# Patient Record
Sex: MALE | Race: OTHER | HISPANIC OR LATINO | Employment: FULL TIME | URBAN - METROPOLITAN AREA
[De-identification: names, ages, dates, MRNs, and addresses within clinical notes are randomized per-mention and may not be internally consistent; named-entity substitution may affect disease eponyms.]

---

## 2019-12-18 ENCOUNTER — APPOINTMENT (OUTPATIENT)
Dept: RADIOLOGY | Facility: MEDICAL CENTER | Age: 39
End: 2019-12-18
Payer: OTHER MISCELLANEOUS

## 2019-12-18 ENCOUNTER — APPOINTMENT (OUTPATIENT)
Dept: URGENT CARE | Facility: MEDICAL CENTER | Age: 39
End: 2019-12-18
Payer: OTHER MISCELLANEOUS

## 2019-12-18 DIAGNOSIS — M25.511 RIGHT SHOULDER PAIN, UNSPECIFIED CHRONICITY: ICD-10-CM

## 2019-12-18 DIAGNOSIS — M54.9 BACK PAIN, UNSPECIFIED BACK LOCATION, UNSPECIFIED BACK PAIN LATERALITY, UNSPECIFIED CHRONICITY: Primary | ICD-10-CM

## 2019-12-18 DIAGNOSIS — M54.9 BACK PAIN, UNSPECIFIED BACK LOCATION, UNSPECIFIED BACK PAIN LATERALITY, UNSPECIFIED CHRONICITY: ICD-10-CM

## 2019-12-18 PROCEDURE — 73030 X-RAY EXAM OF SHOULDER: CPT

## 2019-12-18 PROCEDURE — G0382 LEV 3 HOSP TYPE B ED VISIT: HCPCS | Performed by: PHYSICIAN ASSISTANT

## 2019-12-18 PROCEDURE — 72100 X-RAY EXAM L-S SPINE 2/3 VWS: CPT

## 2019-12-18 PROCEDURE — 99283 EMERGENCY DEPT VISIT LOW MDM: CPT | Performed by: PHYSICIAN ASSISTANT

## 2019-12-21 ENCOUNTER — APPOINTMENT (OUTPATIENT)
Dept: URGENT CARE | Facility: MEDICAL CENTER | Age: 39
End: 2019-12-21
Payer: OTHER MISCELLANEOUS

## 2019-12-21 PROCEDURE — 99213 OFFICE O/P EST LOW 20 MIN: CPT

## 2019-12-27 ENCOUNTER — APPOINTMENT (OUTPATIENT)
Dept: URGENT CARE | Facility: MEDICAL CENTER | Age: 39
End: 2019-12-27
Payer: OTHER MISCELLANEOUS

## 2019-12-27 PROCEDURE — 99213 OFFICE O/P EST LOW 20 MIN: CPT | Performed by: PHYSICIAN ASSISTANT

## 2020-01-08 ENCOUNTER — APPOINTMENT (OUTPATIENT)
Dept: URGENT CARE | Facility: MEDICAL CENTER | Age: 40
End: 2020-01-08
Payer: OTHER MISCELLANEOUS

## 2020-01-08 PROCEDURE — 99213 OFFICE O/P EST LOW 20 MIN: CPT | Performed by: PHYSICIAN ASSISTANT

## 2020-01-15 ENCOUNTER — APPOINTMENT (OUTPATIENT)
Dept: URGENT CARE | Facility: MEDICAL CENTER | Age: 40
End: 2020-01-15
Payer: OTHER MISCELLANEOUS

## 2020-01-15 PROCEDURE — 99213 OFFICE O/P EST LOW 20 MIN: CPT | Performed by: PHYSICIAN ASSISTANT

## 2020-06-29 ENCOUNTER — APPOINTMENT (OUTPATIENT)
Dept: RADIOLOGY | Facility: MEDICAL CENTER | Age: 40
End: 2020-06-29
Payer: OTHER MISCELLANEOUS

## 2020-06-29 ENCOUNTER — OCCMED (OUTPATIENT)
Dept: URGENT CARE | Facility: MEDICAL CENTER | Age: 40
End: 2020-06-29
Payer: OTHER MISCELLANEOUS

## 2020-06-29 DIAGNOSIS — M54.16 LUMBAR BACK PAIN WITH RADICULOPATHY AFFECTING LEFT LOWER EXTREMITY: Primary | ICD-10-CM

## 2020-06-29 DIAGNOSIS — M54.16 LUMBAR BACK PAIN WITH RADICULOPATHY AFFECTING LEFT LOWER EXTREMITY: ICD-10-CM

## 2020-06-29 PROCEDURE — 72110 X-RAY EXAM L-2 SPINE 4/>VWS: CPT

## 2020-06-29 PROCEDURE — G0382 LEV 3 HOSP TYPE B ED VISIT: HCPCS | Performed by: PHYSICIAN ASSISTANT

## 2020-06-29 PROCEDURE — 99283 EMERGENCY DEPT VISIT LOW MDM: CPT | Performed by: PHYSICIAN ASSISTANT

## 2020-07-06 ENCOUNTER — APPOINTMENT (OUTPATIENT)
Dept: URGENT CARE | Facility: MEDICAL CENTER | Age: 40
End: 2020-07-06
Payer: OTHER MISCELLANEOUS

## 2020-07-06 PROCEDURE — 99213 OFFICE O/P EST LOW 20 MIN: CPT | Performed by: PHYSICIAN ASSISTANT

## 2020-07-20 ENCOUNTER — APPOINTMENT (OUTPATIENT)
Dept: URGENT CARE | Facility: MEDICAL CENTER | Age: 40
End: 2020-07-20
Payer: OTHER MISCELLANEOUS

## 2020-07-20 PROCEDURE — 99214 OFFICE O/P EST MOD 30 MIN: CPT | Performed by: FAMILY MEDICINE

## 2020-07-23 VITALS
BODY MASS INDEX: 26.3 KG/M2 | HEART RATE: 67 BPM | HEIGHT: 76 IN | SYSTOLIC BLOOD PRESSURE: 122 MMHG | WEIGHT: 216 LBS | DIASTOLIC BLOOD PRESSURE: 76 MMHG

## 2020-07-23 DIAGNOSIS — M51.26 BULGING LUMBAR DISC: ICD-10-CM

## 2020-07-23 DIAGNOSIS — G89.29 CHRONIC LEFT-SIDED LOW BACK PAIN WITH LEFT-SIDED SCIATICA: Primary | ICD-10-CM

## 2020-07-23 DIAGNOSIS — M54.42 CHRONIC LEFT-SIDED LOW BACK PAIN WITH LEFT-SIDED SCIATICA: Primary | ICD-10-CM

## 2020-07-23 PROCEDURE — 99204 OFFICE O/P NEW MOD 45 MIN: CPT | Performed by: EMERGENCY MEDICINE

## 2020-07-23 RX ORDER — MELOXICAM 15 MG/1
15 TABLET ORAL DAILY
Qty: 30 TABLET | Refills: 0 | Status: SHIPPED | OUTPATIENT
Start: 2020-07-23 | End: 2020-12-04

## 2020-07-23 NOTE — PROGRESS NOTES
Assessment/Plan:    Diagnoses and all orders for this visit:    Chronic left-sided low back pain with left-sided sciatica  -     meloxicam (MOBIC) 15 mg tablet; Take 1 tablet (15 mg total) by mouth daily    Bulging lumbar disc  -     meloxicam (MOBIC) 15 mg tablet; Take 1 tablet (15 mg total) by mouth daily    Will start mobic, continue PT as he's only had 3 sessions  He does have mild left leg weakness L3 and 4  Completed course of prednisone   If no improvement t/c referral to Pain Management given chronicity and weakness  Reviewed previous Xray and MRI results  No Occ Med notes available  Radiculopathy information and Mobic instructions provided  Work note provided with restrictions    Return in about 2 weeks (around 8/6/2020)  Chief Complaint:     Chief Complaint   Patient presents with    Spine - Pain       Subjective:   Patient ID: Ce Saenz is a 35 y o  male  WC DOI 12/2019    NP presents referred by [facility] for work related injury, c/o left lower back, buttocks and leg pain along with sense of weakness, did complete prednisone course, was taking ibuprofen PRN and prescribed muscle relaxant  Only recently started PT had 3 sessions  Denies changes b/b  MRI lumbar spine was obtained  Pain worse with sitting and standing > 20 min  Has been out of work recently  Review of Systems   Constitutional: Negative for chills and fever  HENT: Negative for sore throat and trouble swallowing  Eyes: Negative for pain and discharge  Respiratory: Negative for choking and shortness of breath  Cardiovascular: Negative for chest pain and palpitations  Gastrointestinal: Negative for abdominal pain and vomiting  Endocrine: Negative for cold intolerance and heat intolerance  Musculoskeletal: Positive for back pain  Negative for neck pain and neck stiffness  Neurological: Positive for weakness and numbness  Negative for dizziness     Psychiatric/Behavioral: Negative for self-injury and suicidal ideas  The following portions of the patient's chart were reviewed and updated as appropriate: Allergy:  No Known Allergies    History reviewed  No pertinent past medical history  History reviewed  No pertinent surgical history  Social History     Socioeconomic History    Marital status: Single     Spouse name: Not on file    Number of children: Not on file    Years of education: Not on file    Highest education level: Not on file   Occupational History    Not on file   Social Needs    Financial resource strain: Not on file    Food insecurity:     Worry: Not on file     Inability: Not on file    Transportation needs:     Medical: Not on file     Non-medical: Not on file   Tobacco Use    Smoking status: Never Smoker    Smokeless tobacco: Never Used   Substance and Sexual Activity    Alcohol use: Not Currently    Drug use: Not Currently    Sexual activity: Not on file   Lifestyle    Physical activity:     Days per week: Not on file     Minutes per session: Not on file    Stress: Not on file   Relationships    Social connections:     Talks on phone: Not on file     Gets together: Not on file     Attends Shinto service: Not on file     Active member of club or organization: Not on file     Attends meetings of clubs or organizations: Not on file     Relationship status: Not on file    Intimate partner violence:     Fear of current or ex partner: Not on file     Emotionally abused: Not on file     Physically abused: Not on file     Forced sexual activity: Not on file   Other Topics Concern    Not on file   Social History Narrative    Not on file       History reviewed  No pertinent family history  Medications:    Current Outpatient Medications:     meloxicam (MOBIC) 15 mg tablet, Take 1 tablet (15 mg total) by mouth daily, Disp: 30 tablet, Rfl: 0    There is no problem list on file for this patient        Objective:  /76   Pulse 67   Ht 6' 4" (1 93 m) Wt 98 kg (216 lb)   BMI 26 29 kg/m²     Back Exam     Range of Motion   Extension: normal   Flexion: abnormal     Muscle Strength   Right Quadriceps:  5/5   Left Quadriceps:  4/5     Tests   Straight leg raise right: negative  Straight leg raise left: positive    Reflexes   Patellar: normal    Other   Toe walk: normal  Heel walk: normal  Sensation: normal  Erythema: no back redness            Physical Exam   Constitutional: He is oriented to person, place, and time  He appears well-developed and well-nourished  HENT:   Head: Normocephalic and atraumatic  Eyes: Conjunctivae are normal    Neck: Neck supple  Pulmonary/Chest: Effort normal    Neurological: He is alert and oriented to person, place, and time  Skin: Skin is warm and dry  Psychiatric: He has a normal mood and affect  His behavior is normal    Vitals reviewed  Neurologic Exam     Mental Status   Oriented to person, place, and time  Motor Exam     Strength   Right quadriceps: 5/5  Left quadriceps: 4/5      Procedures    I have personally reviewed the written report of the pertinent studies       MRI Lumbar Spine 7/15/20  L4-5 Left lateral disc protrusion with mild to modrate compromise of left foramen  L5S1 broad based central disc protrustion      6/2020  LUMBAR SPINE     INDICATION:   M54 16: Radiculopathy, lumbar region      COMPARISON:  None     VIEWS:  XR SPINE LUMBAR MINIMUM 4 VIEWS NON INJURY     FINDINGS:     There are 5 non rib bearing lumbar vertebral bodies       There is no evidence of acute fracture or destructive osseous lesion      Alignment is unremarkable       No significant lumbar degenerative change noted      The pedicles appear intact      Soft tissues are unremarkable      IMPRESSION:     Normal examination

## 2020-07-23 NOTE — PATIENT INSTRUCTIONS
While taking meloxicam do not take any other NSAIDs such as Advil, ibuprofen, naproxen or Aleve  However you may take Tylenol  Lumbar Radiculopathy   WHAT YOU NEED TO KNOW:   Lumbar radiculopathy is a painful condition that happens when a nerve in your lumbar spine (lower back) is pinched or irritated  Nerves control feeling and movement in your body  You may have numbness or pain that shoots down from your lower back towards your foot  DISCHARGE INSTRUCTIONS:   Medicines:   · Medicines:     ¨ NSAIDs , such as ibuprofen, help decrease swelling, pain, and fever  This medicine is available with or without a doctor's order  NSAIDs can cause stomach bleeding or kidney problems in certain people  If you take blood thinner medicine, always ask your healthcare provider if NSAIDs are safe for you  Always read the medicine label and follow directions  ¨ Muscle relaxers  help decrease pain and muscle spasms  ¨ Opioids: This is a strong medicine given to reduce severe pain  It is also called narcotic pain medicine  Take this medicine exactly as directed by your healthcare provider  ¨ Oral steroids: Steroids may also be given to reduce pain and swelling  ¨ Take your medicine as directed  Contact your healthcare provider if you think your medicine is not helping or if you have side effects  Tell him of her if you are allergic to any medicine  Keep a list of the medicines, vitamins, and herbs you take  Include the amounts, and when and why you take them  Bring the list or the pill bottles to follow-up visits  Carry your medicine list with you in case of an emergency  Follow up with your healthcare provider or spine specialist within 1 to 3 weeks:  After your first follow-up appointment, return to your healthcare provider or spine specialist every 2 weeks until you have healed  Ask for information about physical therapy for your condition   Write down your questions so you remember to ask them during your visits  Physical therapy:  You may need physical therapy to improve your condition  Your physical therapist may teach you certain exercises to improve posture (the way you stand and sit), flexibility, and strength in your lower back  Self care:   · Stay active: It is best to be active when you have lumbar radiculopathy  Your physical therapist or healthcare provider may tell you to take walks to ease yourself back into your daily routine  Avoid long periods of bed rest  Bed rest could worsen your symptoms  Do not move in ways that increase your pain  Ask for more information about the best ways to stay active  · Use ice or heat packs:  Use ice or heat packs as directed on the sore area of your body to decrease the pain and swelling  Put ice in a plastic bag covered with a towel on your low back  Cover heated items with a towel to avoid burns  Use ice and heat as directed  · Avoid heavy lifting: Your condition may worsen if you lift heavy things  Avoid lifting if possible  · Maintain a healthy weight:  Excess body weight may strain your back  Talk with your healthcare provider about ways to lose excess weight if you are overweight  Contact your healthcare provider or spine specialist if:   · Your pain does not improve within 1 to 3 weeks after treatment  · Your pain and weakness keep you from your normal activities at work, home, or school  · You lose more than 10 pounds in 6 months without trying  · You become depressed or sad because of the pain  · You have questions or concerns about your condition or care  Return to the emergency department if:   · You have a fever greater than 100 4°F for longer than 2 days  · You have new, severe back or leg pain, or your pain spreads to both legs  · You have any new signs of numbness or weakness, especially in your lower back, legs, arms, or genital area  · You have new trouble controlling your urine and bowel movements      · You do not feel like your bladder empties when you urinate  © 2017 [employer] Information is for End User's use only and may not be sold, redistributed or otherwise used for commercial purposes  All illustrations and images included in CareNotes® are the copyrighted property of A D A M , Inc  or [employer]  The above information is an  only  It is not intended as medical advice for individual conditions or treatments  Talk to your doctor, nurse or pharmacist before following any medical regimen to see if it is safe and effective for you  Radiculopatía lumbar   LO QUE NECESITA SABER:   La radiculopatía lumbar es marina enfermedad dolorosa que sucede cuando un nervio de gates paul lumbar (parte baja de la espalda) se pinza o se irrita  Los nervios controlan las sensaciones y movimientos de gates cuerpo  Usted podría tener adormecimiento o dolor desde la parte inferior de la espalda hacia erica pies  INSTRUCCIONES SOBRE EL [facility]ITAL[facility]:   Medicamentos:   · Medicamentos:     ¨ AINEs (Analgésicos antiinflamatorios no esteroides) ishmael el ibuprofeno, ayudan a disminuir la inflamación, el dolor y la fiebre  Linda medicamento esta disponible con o sin marina receta médica  Los AINEs pueden causar sangrado estomacal o problemas renales en ciertas personas  Si usted debra un medicamento anticoagulante, siempre pregúntele a gates médico si los PAUL son seguros para usted  Siempre herbie la etiqueta de linda medicamento y [place] instrucciones  ¨ Relajantes musculares  ayudan a reducir dolor y espasmos musculares  ¨ Opioides: Estos son medicamentos muy mario que se administran para reducir el dolor severo  También se les conoce ishmael medicamentos narcóticos para el dolor  [place] el medicamento exactamente ishmael le indicó gates médico     ¨ Esteroides orales: Los esteroides se administran para reducir la inflamación y el dolor  ¨ [place] erica medicamentos ishmael se le haya indicado    Consulte con gates médico si usted alysia que gates medicamento no le está ayudando o si presenta efectos secundarios  Infórmele si es alérgico a algún medicamento  Mantenga marina lista actualizada de los [place], las vitaminas y los productos herbales que debra  Incluya los siguientes datos de los medicamentos: cantidad, frecuencia y motivo de administración  Traiga con usted la lista o los envases de la píldoras a erica citas de seguimiento  Lleve la lista de los medicamentos con usted en scott de marina emergencia  Programe marina nadir con gates médico o especialista en columna dentro de 1 a 3 semanas:  Después de gates primera nadir de seguimiento, vuelva a gates médico o especialista en columna cada 2 semanas hasta que se haya curado  Solicitar información acerca de fisioterapia para gates problema  Anote erica preguntas para que se acuerde de hacerlas alexis erica visitas  Fisioterapia:  Usted podría necesitar fisioterapia para ayudar a mejorar gates condición  Gates fisioterapeuta le podría enseñar ejercicios para ayudar a mejorar gates postura (la forma que usted se para y se sienta), flexibilidad, y fuerza en la parte inferior de gates espalda  Cuidado personal:   · Manténgase activo: Es mejor ser activo cuando se padece de radiculopatía lumbar  Gates fisioterapeuta o médico probablemente le recomendarán que salga a caminar para poco a poco regresar a gates rutina diaria  Evite guardar cama por largos periodos de [place]  Guardar cama podría empeorar erica síntomas  No se mueva en formas que empeoran gates dolor  Pregúntele a gates médico por más información sobre la mejor forma de permanecer activo  · Uso de compresas frías o calientes:  Use compresas frías o calientes en el área adolorida para disminuir el dolor y la inflamación  Ponga hielo en marina bolsa plástica y cúbrala con marina toalla y colóquela sobre la parte de abajo de gates espalda  [place] las compresas calientes con marina toalla para evitar quemaduras  Use el hielo ishmael se lo indican  · Evite levantar objetos pesados:  Gates condición podría empeorar si usted levanta cosas pesadas  Si es posible evite alzar del todo  · Mantenga un peso saludable:  El peso en exceso podría torcer farmer espalda  Hable con farmer médico sobre formas de adelgazar si usted sufre de [place]  Comuníquese con farmer médico o especialista en columna si:   · El dolor que siente no mejora dentro de 1 a 3 semanas después del [place]  · El dolor y la debilidad que siente evitan que usted lleve a cabo erica actividades normales en el [place], casa o escuela  · Usted baja más de 10 libras en 6 meses sin proponérselo  · Usted está deprimido o angelina debido al dolor que siente  · Usted tiene preguntas o inquietudes acerca de farmer condición o cuidado  Regrese a la antoine de emergencias si:   · Tiene temperatura de más de 100 4°F por más de 2 días  · Tiene un dolor de espalda o pierna severo que no sentía antes, o el dolor se propaga a ambas piernas  · Presenta señales nuevas de adormecimiento o debilidad, sobre todo en la parte de abajo de la espalda, piernas, brazos o genitales  · Tiene problemas nuevos para controlar la [place] y heces  · Usted siente ishmael si farmer vejiga no se vaciara cuando orina  © 2017 [employer] [employer] Information is for End User's use only and may not be sold, redistributed or otherwise used for commercial purposes  All illustrations and images included in CareNotes® are the copyrighted property of A D A M , Inc  or [employer]  Esta información es sólo para uso en educación  Farmer intención no es darle un consejo médico sobre enfermedades o tratamientos  Colsulte con farmer [place] farmacéutico antes de seguir cualquier régimen médico para saber si es seguro y efectivo para usted

## 2020-07-23 NOTE — LETTER
July 23, 2020     Patient: Barb Gregory   YOB: 1980   Date of Visit: 7/23/2020       To Whom it May Concern:    Barb Gregory is under my professional care  He was seen in my office on 7/23/2020  He may return to restricted duty:  No prolonged sitting/standing/walking, allow sitting and standing breaks as needed  Max lifting 5 lbs  No bending/twisting/squatting  F/U 2 weeks    If you have any questions or concerns, please don't hesitate to call           Sincerely,          Oumou Escamilla MD        CC: No Recipients

## 2020-08-06 VITALS
DIASTOLIC BLOOD PRESSURE: 88 MMHG | WEIGHT: 216 LBS | HEIGHT: 76 IN | HEART RATE: 73 BPM | SYSTOLIC BLOOD PRESSURE: 131 MMHG | BODY MASS INDEX: 26.3 KG/M2

## 2020-08-06 DIAGNOSIS — M54.42 CHRONIC LEFT-SIDED LOW BACK PAIN WITH LEFT-SIDED SCIATICA: Primary | ICD-10-CM

## 2020-08-06 DIAGNOSIS — R29.898 LEFT LEG WEAKNESS: ICD-10-CM

## 2020-08-06 DIAGNOSIS — G89.29 CHRONIC LEFT-SIDED LOW BACK PAIN WITH LEFT-SIDED SCIATICA: Primary | ICD-10-CM

## 2020-08-06 DIAGNOSIS — M51.26 BULGING LUMBAR DISC: ICD-10-CM

## 2020-08-06 PROCEDURE — 99213 OFFICE O/P EST LOW 20 MIN: CPT | Performed by: EMERGENCY MEDICINE

## 2020-08-06 NOTE — PROGRESS NOTES
Assessment/Plan:    Diagnoses and all orders for this visit:    Chronic left-sided low back pain with left-sided sciatica    Left leg weakness    Bulging lumbar disc    Patient subjectively improveing however still with weakness left leg on exam   Declines referral to Pain Management at this time and would like several more weeks of PT  Continue restrictions, meloxicam and PT, if no improvement will refer to Pain Management  Return in about 2 weeks (around 8/20/2020)  Chief Complaint:     Chief Complaint   Patient presents with    Spine - Follow-up       Subjective:   Patient ID: Paula Iyer is a 35 y o  male  WC DOI 12/2019  Patient returns with improvement, he notes pain 3/10 baseline, he notes increased pain with standing, improved pain levels with sitting  Started mobic, continue PT as he's only had 3 sessions  He does have mild left leg weakness L3 and 4 and notes sometimes leg is weak  Completed course of prednisone     Initial note:  NP presents referred by [facility] for work related injury, c/o left lower back, buttocks and leg pain along with sense of weakness, did complete prednisone course, was taking ibuprofen PRN and prescribed muscle relaxant  Only recently started PT had 3 sessions  Denies changes b/b  MRI lumbar spine was obtained  Pain worse with sitting and standing > 20 min  Has been out of work recently  Review of Systems    The following portions of the patient's chart were reviewed and updated as appropriate: Allergy:  No Known Allergies    History reviewed  No pertinent past medical history  History reviewed  No pertinent surgical history      Social History     Socioeconomic History    Marital status: Single     Spouse name: Not on file    Number of children: Not on file    Years of education: Not on file    Highest education level: Not on file   Occupational History    Not on file   Social Needs    Financial resource strain: Not on file   [place] insecurity     Worry: Not on file     Inability: Not on file    Transportation needs     Medical: Not on file     Non-medical: Not on file   Tobacco Use    Smoking status: Never Smoker    Smokeless tobacco: Never Used   Substance and Sexual Activity    Alcohol use: Not Currently    Drug use: Not Currently    Sexual activity: Not on file   Lifestyle    Physical activity     Days per week: Not on file     Minutes per session: Not on file    Stress: Not on file   Relationships    Social connections     Talks on phone: Not on file     Gets together: Not on file     Attends [employer] service: Not on file     Active member of club or organization: Not on file     Attends meetings of clubs or organizations: Not on file     Relationship status: Not on file    Intimate partner violence     Fear of current or ex partner: Not on file     Emotionally abused: Not on file     Physically abused: Not on file     Forced sexual activity: Not on file   Other Topics Concern    Not on file   Social History Narrative    Not on file       History reviewed  No pertinent family history  Medications:    Current Outpatient Medications:     meloxicam (MOBIC) 15 mg tablet, Take 1 tablet (15 mg total) by mouth daily, Disp: 30 tablet, Rfl: 0    There is no problem list on file for this patient  Objective:  /88   Pulse 73   Ht 6' 4" (1 93 m)   Wt 98 kg (216 lb)   BMI 26 29 kg/m²     Back Exam     Muscle Strength   Right Quadriceps:  5/5   Left Quadriceps:  4/5             Physical Exam   Constitutional: He is oriented to person, place, and time  He appears well-developed  HENT:   Head: Normocephalic and atraumatic  Eyes: Conjunctivae are normal    Neck: Neck supple  Pulmonary/Chest: Effort normal    Neurological: He is alert and oriented to person, place, and time  Skin: Skin is warm and dry  Psychiatric: His behavior is normal    Vitals reviewed          Neurologic Exam     Mental Status   Oriented to person, place, and time  Motor Exam     Strength   Right iliopsoas: 5/5  Left iliopsoas: 4/5  Right quadriceps: 5/5  Left quadriceps: 4/5      Procedures    I have personally reviewed the written report of the pertinent studies       MRI Lumbar Spine 7/15/20  L4-5 Left lateral disc protrusion with mild to modrate compromise of left foramen  L5S1 broad based central disc protrustion

## 2020-08-06 NOTE — LETTER
August 6, 2020     Patient: Kim Dorantes   YOB: 1980   Date of Visit: 8/6/2020       To Whom it May Concern:    Kim Dorantes is under my professional care  He was seen in my office on 8/6/2020  He may return to restricted duty:  No prolonged sitting/standing/walking, allow sitting and standing breaks as needed  Max lifting 5 lbs  No bending/twisting/squatting  F/U 2 weeks    If you have any questions or concerns, please don't hesitate to call           Sincerely,          Oneida Crandall MD        CC: Kim Jayna

## 2020-08-20 VITALS
WEIGHT: 216 LBS | BODY MASS INDEX: 26.3 KG/M2 | DIASTOLIC BLOOD PRESSURE: 74 MMHG | HEIGHT: 76 IN | SYSTOLIC BLOOD PRESSURE: 112 MMHG | HEART RATE: 73 BPM

## 2020-08-20 DIAGNOSIS — R29.898 LEFT LEG WEAKNESS: ICD-10-CM

## 2020-08-20 DIAGNOSIS — M51.26 BULGING LUMBAR DISC: ICD-10-CM

## 2020-08-20 DIAGNOSIS — G89.29 CHRONIC LEFT-SIDED LOW BACK PAIN WITH LEFT-SIDED SCIATICA: Primary | ICD-10-CM

## 2020-08-20 DIAGNOSIS — M54.42 CHRONIC LEFT-SIDED LOW BACK PAIN WITH LEFT-SIDED SCIATICA: Primary | ICD-10-CM

## 2020-08-20 PROCEDURE — 99213 OFFICE O/P EST LOW 20 MIN: CPT | Performed by: EMERGENCY MEDICINE

## 2020-08-20 NOTE — LETTER
August 20, 2020     Patient: Iqra Friday   YOB: 1980   Date of Visit: 8/20/2020       To Whom it May Concern:    Iqra Friday is under my professional care  He was seen in my office on 8/20/2020  He may return to restricted duty:  No prolonged sitting/standing/walking, allow sitting and standing breaks as needed  Max lifting 5 lbs  No bending/twisting/squatting  Referred to Pain Management  F/U 2 weeks    If you have any questions or concerns, please don't hesitate to call           Sincerely,          Mardelle Nageotte, MD        CC: No Recipients

## 2020-08-20 NOTE — PROGRESS NOTES
Assessment/Plan:    Diagnoses and all orders for this visit:    Chronic left-sided low back pain with left-sided sciatica  -     Ambulatory referral to Pain Management; Future    Left leg weakness  -     Ambulatory referral to Pain Management; Future    Bulging lumbar disc  -     Ambulatory referral to Pain Management; Future     Patient referred to pain management for chronic symptoms he may continue physical therapy and meloxicam or ibuprofen  We will continue work restrictions  Return in about 2 weeks (around 9/3/2020)  Chief Complaint:     Chief Complaint   Patient presents with    Spine - Follow-up       Subjective:   Patient ID: Ce Saenz is a 35 y o  male  WC DOI 12/2019  Patient returns with continued pain of the lower back in states he was doing well benefitting from physical therapy however this past Monday he did experience increased pain and he is back to his baseline  He does experience pain radiated down the left leg  He said he stop taking the meloxicam and ibuprofen as he was concerned that he did have taking it for too long any was concerned about the side effects  He is now more receptive to being referred to pain management    Previous note:  Patient returns with improvement, he notes pain 3/10 baseline, he notes increased pain with standing, improved pain levels with sitting  Started mobic, continue PT as he's only had 3 sessions  He does have mild left leg weakness L3 and 4 and notes sometimes leg is weak  Completed course of prednisone     Initial note:  NP presents referred by [facility] for work related injury, c/o left lower back, buttocks and leg pain along with sense of weakness, did complete prednisone course, was taking ibuprofen PRN and prescribed muscle relaxant  Only recently started PT had 3 sessions  Denies changes b/b  MRI lumbar spine was obtained  Pain worse with sitting and standing > 20 min  Has been out of work recently          Review of Systems    The following portions of the patient's chart were reviewed and updated as appropriate: Allergy:  No Known Allergies    History reviewed  No pertinent past medical history  History reviewed  No pertinent surgical history  Social History     Socioeconomic History    Marital status: Single     Spouse name: Not on file    Number of children: Not on file    Years of education: Not on file    Highest education level: Not on file   Occupational History    Not on file   Social Needs    Financial resource strain: Not on file    Food insecurity     Worry: Not on file     Inability: Not on file    Transportation needs     Medical: Not on file     Non-medical: Not on file   Tobacco Use    Smoking status: Never Smoker    Smokeless tobacco: Never Used   Substance and Sexual Activity    Alcohol use: Not Currently    Drug use: Not Currently    Sexual activity: Not on file   Lifestyle    Physical activity     Days per week: Not on file     Minutes per session: Not on file    Stress: Not on file   Relationships    Social connections     Talks on phone: Not on file     Gets together: Not on file     Attends Zoroastrianism service: Not on file     Active member of club or organization: Not on file     Attends meetings of clubs or organizations: Not on file     Relationship status: Not on file    Intimate partner violence     Fear of current or ex partner: Not on file     Emotionally abused: Not on file     Physically abused: Not on file     Forced sexual activity: Not on file   Other Topics Concern    Not on file   Social History Narrative    Not on file       History reviewed  No pertinent family history  Medications:    Current Outpatient Medications:     meloxicam (MOBIC) 15 mg tablet, Take 1 tablet (15 mg total) by mouth daily (Patient not taking: Reported on 8/20/2020), Disp: 30 tablet, Rfl: 0    There is no problem list on file for this patient        Objective:  /74   Pulse 73   Ht 6' 4" (1 93 m)   Wt 98 kg (216 lb)   BMI 26 29 kg/m²     Back Exam     Range of Motion   Extension: abnormal   Flexion: abnormal     Other   Gait: antalgic             Physical Exam  Vitals signs reviewed  Constitutional:       Appearance: He is well-developed  HENT:      Head: Normocephalic and atraumatic  Eyes:      Conjunctiva/sclera: Conjunctivae normal    Neck:      Musculoskeletal: Neck supple  Pulmonary:      Effort: Pulmonary effort is normal    Skin:     General: Skin is warm and dry  Neurological:      Mental Status: He is alert and oriented to person, place, and time  Psychiatric:         Behavior: Behavior normal            Neurologic Exam     Mental Status   Oriented to person, place, and time  Procedures    I have personally reviewed the written report of the pertinent studies

## 2020-08-25 ENCOUNTER — TELEPHONE (OUTPATIENT)
Dept: OBGYN CLINIC | Facility: CLINIC | Age: 40
End: 2020-08-25

## 2020-08-25 NOTE — TELEPHONE ENCOUNTER
Dr Fabian Pina from [facility] called to request finer details in Namrata Sierra's work note  Please remove "prolonged" and "as needed" and replace with frequency amount and duration times  [employer] will not accept letter as written  Please fax to [phone] attention [facility]    Thank you

## 2020-09-03 ENCOUNTER — OFFICE VISIT (OUTPATIENT)
Dept: OBGYN CLINIC | Facility: MEDICAL CENTER | Age: 40
End: 2020-09-03
Payer: OTHER MISCELLANEOUS

## 2020-09-03 VITALS
BODY MASS INDEX: 26.3 KG/M2 | DIASTOLIC BLOOD PRESSURE: 83 MMHG | SYSTOLIC BLOOD PRESSURE: 131 MMHG | HEIGHT: 76 IN | WEIGHT: 216 LBS | HEART RATE: 70 BPM

## 2020-09-03 DIAGNOSIS — G89.29 CHRONIC LEFT-SIDED LOW BACK PAIN WITH LEFT-SIDED SCIATICA: Primary | ICD-10-CM

## 2020-09-03 DIAGNOSIS — M54.42 CHRONIC LEFT-SIDED LOW BACK PAIN WITH LEFT-SIDED SCIATICA: Primary | ICD-10-CM

## 2020-09-03 DIAGNOSIS — R29.898 LEFT LEG WEAKNESS: ICD-10-CM

## 2020-09-03 DIAGNOSIS — M51.26 BULGING LUMBAR DISC: ICD-10-CM

## 2020-09-03 PROCEDURE — 99213 OFFICE O/P EST LOW 20 MIN: CPT | Performed by: EMERGENCY MEDICINE

## 2020-09-03 NOTE — LETTER
September 3, 2020     Patient: Ulices Shelton   YOB: 1980   Date of Visit:        To Whom it May Concern:    Ulices Shelton is under my professional care  He may return to restricted duty:  No prolonged sitting/standing/walking greater than 30 minutes to 1 hour unless patient tolerates  Please allow sitting and standing breaks as needed  Max lifting 5 lbs  No bending/twisting/squatting  F/u 2 weeks  If you have any questions or concerns, please don't hesitate to call           Sincerely,          Tanisha Lares MD        CC: No Recipients

## 2020-09-03 NOTE — PROGRESS NOTES
Assessment/Plan:    Diagnoses and all orders for this visit:    Chronic left-sided low back pain with left-sided sciatica    Left leg weakness    Bulging lumbar disc    Continue PT, patient to see Pain Management tomorrow  Work note provided  LEFT lateral lumbar disc protrusion L4-5    Return in about 2 weeks (around 9/17/2020)  Chief Complaint:     Chief Complaint   Patient presents with    Spine - Follow-up        Subjective:   Patient ID: Ce Saenz is a 35 y o  male  WC DOI 12/2019  Patient returns with no improvement, he notes increased pain Tuesday, he has been participating in PT, no light duty available  Previous note:  Patient returns with continued pain of the lower back in states he was doing well benefitting from physical therapy however this past Monday he did experience increased pain and he is back to his baseline  He does experience pain radiated down the left leg  He said he stop taking the meloxicam and ibuprofen as he was concerned that he did have taking it for too long any was concerned about the side effects  He is now more receptive to being referred to pain management    Previous note:  Patient returns with improvement, he notes pain 3/10 baseline, he notes increased pain with standing, improved pain levels with sitting  Started mobic, continue PT as he's only had 3 sessions  He does have mild left leg weakness L3 and 4 and notes sometimes leg is weak  Completed course of prednisone     Initial note:  NP presents referred by [facility] for work related injury, c/o left lower back, buttocks and leg pain along with sense of weakness, did complete prednisone course, was taking ibuprofen PRN and prescribed muscle relaxant  Only recently started PT had 3 sessions  Denies changes b/b  MRI lumbar spine was obtained  Pain worse with sitting and standing > 20 min  Has been out of work recently          Review of Systems    The following portions of the patient's chart were reviewed and updated as appropriate: Allergy:  No Known Allergies    History reviewed  No pertinent past medical history  History reviewed  No pertinent surgical history  Social History     Socioeconomic History    Marital status: Single     Spouse name: Not on file    Number of children: Not on file    Years of education: Not on file    Highest education level: Not on file   Occupational History    Not on file   Social Needs    Financial resource strain: Not on file    Food insecurity     Worry: Not on file     Inability: Not on file    Transportation needs     Medical: Not on file     Non-medical: Not on file   Tobacco Use    Smoking status: Never Smoker    Smokeless tobacco: Never Used   Substance and Sexual Activity    Alcohol use: Not Currently    Drug use: Not Currently    Sexual activity: Not on file   Lifestyle    Physical activity     Days per week: Not on file     Minutes per session: Not on file    Stress: Not on file   Relationships    Social connections     Talks on phone: Not on file     Gets together: Not on file     Attends [employer] service: Not on file     Active member of club or organization: Not on file     Attends meetings of clubs or organizations: Not on file     Relationship status: Not on file    Intimate partner violence     Fear of current or ex partner: Not on file     Emotionally abused: Not on file     Physically abused: Not on file     Forced sexual activity: Not on file   Other Topics Concern    Not on file   Social History Narrative    Not on file       History reviewed  No pertinent family history  Medications:    Current Outpatient Medications:     meloxicam (MOBIC) 15 mg tablet, Take 1 tablet (15 mg total) by mouth daily (Patient not taking: Reported on 9/3/2020), Disp: 30 tablet, Rfl: 0    There is no problem list on file for this patient        Objective:  /83   Pulse 70   Ht 6' 4" (1 93 m)   Wt 98 kg (216 lb)   BMI 26 29 kg/m² Back Exam     Range of Motion   Extension: abnormal   Flexion: abnormal     Other   Gait: antalgic             Physical Exam  Vitals signs reviewed  Constitutional:       Appearance: He is well-developed  HENT:      Head: Normocephalic and atraumatic  Eyes:      Conjunctiva/sclera: Conjunctivae normal    Neck:      Musculoskeletal: Neck supple  Pulmonary:      Effort: Pulmonary effort is normal    Skin:     General: Skin is warm and dry  Neurological:      Mental Status: He is alert and oriented to person, place, and time  Psychiatric:         Behavior: Behavior normal            Neurologic Exam     Mental Status   Oriented to person, place, and time  Procedures    I have personally reviewed the written report of the pertinent studies

## 2020-09-04 ENCOUNTER — CONSULT (OUTPATIENT)
Dept: PAIN MEDICINE | Facility: MEDICAL CENTER | Age: 40
End: 2020-09-04
Payer: OTHER MISCELLANEOUS

## 2020-09-04 VITALS
WEIGHT: 219 LBS | HEIGHT: 76 IN | DIASTOLIC BLOOD PRESSURE: 76 MMHG | TEMPERATURE: 98 F | RESPIRATION RATE: 14 BRPM | HEART RATE: 74 BPM | SYSTOLIC BLOOD PRESSURE: 119 MMHG | BODY MASS INDEX: 26.67 KG/M2

## 2020-09-04 DIAGNOSIS — M54.42 CHRONIC LEFT-SIDED LOW BACK PAIN WITH LEFT-SIDED SCIATICA: ICD-10-CM

## 2020-09-04 DIAGNOSIS — G89.29 CHRONIC LEFT-SIDED LOW BACK PAIN WITH LEFT-SIDED SCIATICA: ICD-10-CM

## 2020-09-04 DIAGNOSIS — M54.16 LUMBAR RADICULITIS: Primary | ICD-10-CM

## 2020-09-04 DIAGNOSIS — R29.898 LEFT LEG WEAKNESS: ICD-10-CM

## 2020-09-04 DIAGNOSIS — M51.26 BULGING LUMBAR DISC: ICD-10-CM

## 2020-09-04 PROCEDURE — 99244 OFF/OP CNSLTJ NEW/EST MOD 40: CPT | Performed by: PHYSICAL MEDICINE & REHABILITATION

## 2020-09-04 RX ORDER — IBUPROFEN 200 MG
600 TABLET ORAL EVERY 6 HOURS PRN
COMMUNITY

## 2020-09-04 NOTE — PATIENT INSTRUCTIONS
Lumbar Disc Herniation   AMBULATORY CARE:   Lumbar disc herniation  occurs when a disc in your lumbar spine (lower back) bulges out  Lumbar discs are spongy cushions between the vertebrae (bones) in your spine  The herniated disc may press on your nerves or spinal cord  Common signs and symptoms include the following:  Mild lumbar disc herniation may not cause any signs or symptoms  You may have any of the following if the herniated disc presses against your nerves or spinal cord:  · Pain in your lower back, buttocks, groin, or legs    · Burning, stabbing, or tingling pain that shoots down one or both of your legs    · Numbness or weakness in one leg    · Trouble walking or moving your feet or toes  Seek care immediately if:   · You cannot control when you urinate or have a bowel movement  · You are unable to move one or both of your legs  · You lose feeling in your groin or buttocks  Contact your healthcare provider if:   · You have numbness in one or both of your legs  · You have low back pain while resting  · You have trouble moving one or both of your legs  · You begin leaking urine or bowel movement, and it is not normal for you  · Your pain gets worse, even after you take medicine  · You have questions or concerns about your condition or care  Treatment:  Your healthcare provider may have you rest in bed for a few days  It is best to rest on your side with your knees bent  Put a cushion between your knees to help decrease the pressure on your spine and nerves  You may also need any of following:  · NSAIDs , such as ibuprofen, help decrease swelling, pain, and fever  NSAIDs can cause stomach bleeding or kidney problems in certain people  If you take blood thinner medicine, always ask your healthcare provider if NSAIDs are safe for you  Always read the medicine label and follow directions  · Prescription pain medicine  may be given  Ask how to take this medicine safely      · Muscle relaxers  decrease pain and muscle spasms  · A steroid injection  may be given to reduce inflammation  Steroid medicine is injected into the epidural space  The epidural space is between your spinal cord and vertebrae  You may be given pain medicine along with the steroids  · Physical therapy  may be recommended by your healthcare provider  A physical therapist teaches you exercises to help improve movement and strength, and to decrease pain  A physical therapist can teach you safe ways to bend, lift, sit, and stand to help relieve back pain  · Surgery  may be needed to fix your herniated disc if other treatments have failed  Surgery may be done to remove your herniated disc and make your spine stronger  Surgery may also be done to decrease pressure on your nerves and spinal cord  Manage pain from a lumbar disc herniation:   · Apply heat  on your lower back for 20 to 30 minutes every 2 hours for as many days as directed  Heat helps decrease pain and muscle spasms  · Do low-stress exercise and activity  Exercises that do not stress your back muscles may help decrease your pain  Examples of low-stress exercises are walking, swimming, and biking  Avoid heavy lifting while your back is healing  Try not to sit for long periods of time  Talk to your healthcare provider before you start any new exercise program   Follow up with your healthcare provider as directed:  Write down your questions so you remember to ask them during your visits  © 2017 [employer] Information is for End User's use only and may not be sold, redistributed or otherwise used for commercial purposes  All illustrations and images included in CareNotes® are the copyrighted property of [employer]  or [employer]  The above information is an  only  It is not intended as medical advice for individual conditions or treatments   Talk to your doctor, nurse or pharmacist before following any medical regimen to see if it is safe and effective for you

## 2020-09-18 ENCOUNTER — OFFICE VISIT (OUTPATIENT)
Dept: OBGYN CLINIC | Facility: MEDICAL CENTER | Age: 40
End: 2020-09-18
Payer: OTHER MISCELLANEOUS

## 2020-09-18 VITALS — BODY MASS INDEX: 26.67 KG/M2 | TEMPERATURE: 97.6 F | WEIGHT: 219 LBS | HEIGHT: 76 IN

## 2020-09-18 DIAGNOSIS — G89.29 CHRONIC LEFT-SIDED LOW BACK PAIN WITH LEFT-SIDED SCIATICA: Primary | ICD-10-CM

## 2020-09-18 DIAGNOSIS — M51.26 BULGING LUMBAR DISC: ICD-10-CM

## 2020-09-18 DIAGNOSIS — M54.42 CHRONIC LEFT-SIDED LOW BACK PAIN WITH LEFT-SIDED SCIATICA: Primary | ICD-10-CM

## 2020-09-18 DIAGNOSIS — R29.898 LEFT LEG WEAKNESS: ICD-10-CM

## 2020-09-18 PROCEDURE — 99213 OFFICE O/P EST LOW 20 MIN: CPT | Performed by: EMERGENCY MEDICINE

## 2020-09-18 NOTE — PROGRESS NOTES
Assessment/Plan:    Diagnoses and all orders for this visit:    Chronic left-sided low back pain with left-sided sciatica    Left leg weakness    Bulging lumbar disc    F/U after RICHAR and f/u appt with Pain Management continue work restrictions  Return in about 4 weeks (around 10/16/2020) for on or after 10/23/20  Chief Complaint:     Chief Complaint   Patient presents with    Spine - Follow-up       Subjective:   Patient ID: Shelby Graff is a 35 y o  male  Patient returns having been evaluated by pain management he is scheduled for an epidural injection  States his symptoms are stable he is taking Aleve only as needed as he is concerned about the side effects from taking it chronically  He did hold off on physical therapy as he participated in approximately 1 month but was having increasing pain  Review of Systems    The following portions of the patient's chart were reviewed and updated as appropriate: Allergy:  No Known Allergies      Past Medical History:   Diagnosis Date    Chronic pain disorder     Depression     Low back pain        History reviewed  No pertinent surgical history      Social History     Socioeconomic History    Marital status: Single     Spouse name: Not on file    Number of children: Not on file    Years of education: Not on file    Highest education level: Not on file   Occupational History    Not on file   Social Needs    Financial resource strain: Not on file    Food insecurity     Worry: Not on file     Inability: Not on file    Transportation needs     Medical: Not on file     Non-medical: Not on file   Tobacco Use    Smoking status: Never Smoker    Smokeless tobacco: Never Used   Substance and Sexual Activity    Alcohol use: Not Currently    Drug use: Not Currently    Sexual activity: Not on file   Lifestyle    Physical activity     Days per week: Not on file     Minutes per session: Not on file    Stress: Not on file   Relationships    Social connections     Talks on phone: Not on file     Gets together: Not on file     Attends [employer] service: Not on file     Active member of club or organization: Not on file     Attends meetings of clubs or organizations: Not on file     Relationship status: Not on file    Intimate partner violence     Fear of current or ex partner: Not on file     Emotionally abused: Not on file     Physically abused: Not on file     Forced sexual activity: Not on file   Other Topics Concern    Not on file   Social History Narrative    Not on file       Family History   Problem Relation Age of Onset    No Known Problems Mother     No Known Problems Father        Medications:    Current Outpatient Medications:     ibuprofen (MOTRIN) 200 mg tablet, Take 600 mg by mouth every 6 (six) hours as needed, Disp: , Rfl:     meloxicam (MOBIC) 15 mg tablet, Take 1 tablet (15 mg total) by mouth daily (Patient not taking: Reported on 9/3/2020), Disp: 30 tablet, Rfl: 0    There is no problem list on file for this patient  Objective:  Temp 97 6 °F (36 4 °C)   Ht 6' 4" (1 93 m)   Wt 99 3 kg (219 lb)   BMI 26 66 kg/m²     Back Exam     Other   Gait: antalgic             Physical Exam  Vitals signs reviewed  Constitutional:       Appearance: He is well-developed  HENT:      Head: Normocephalic and atraumatic  Eyes:      Conjunctiva/sclera: Conjunctivae normal    Neck:      Musculoskeletal: Neck supple  Pulmonary:      Effort: Pulmonary effort is normal    Skin:     General: Skin is warm and dry  Neurological:      Mental Status: He is alert and oriented to person, place, and time  Psychiatric:         Behavior: Behavior normal            Neurologic Exam     Mental Status   Oriented to person, place, and time  Procedures    I have personally reviewed the written report of the pertinent studies

## 2020-09-18 NOTE — LETTER
September 18, 2020     Patient: Leonarda Garg   YOB: 1980   Date of Visit:        To Whom it May Concern:    Leonarda Garg is under my professional care  He may return to restricted duty:  No prolonged sitting/standing/walking greater than 30 minutes to 1 hour unless patient tolerates  Please allow sitting and standing breaks as needed  Max lifting 5 lbs  No bending/twisting/squatting  F/u 4 weeks  If you have any questions or concerns, please don't hesitate to call           Sincerely,          Katy Moise MD        CC: No Recipients

## 2020-09-25 ENCOUNTER — HOSPITAL ENCOUNTER (OUTPATIENT)
Dept: RADIOLOGY | Facility: MEDICAL CENTER | Age: 40
Discharge: HOME | End: 2020-09-25
Attending: PHYSICAL MEDICINE & REHABILITATION | Admitting: PHYSICAL MEDICINE & REHABILITATION
Payer: OTHER MISCELLANEOUS

## 2020-09-25 VITALS
DIASTOLIC BLOOD PRESSURE: 73 MMHG | SYSTOLIC BLOOD PRESSURE: 121 MMHG | TEMPERATURE: 98.2 F | HEART RATE: 72 BPM | RESPIRATION RATE: 20 BRPM | OXYGEN SATURATION: 98 %

## 2020-09-25 DIAGNOSIS — M54.16 LUMBAR RADICULITIS: ICD-10-CM

## 2020-09-25 DIAGNOSIS — M54.42 CHRONIC LEFT-SIDED LOW BACK PAIN WITH LEFT-SIDED SCIATICA: ICD-10-CM

## 2020-09-25 DIAGNOSIS — G89.29 CHRONIC LEFT-SIDED LOW BACK PAIN WITH LEFT-SIDED SCIATICA: ICD-10-CM

## 2020-09-25 PROCEDURE — 64483 NJX AA&/STRD TFRM EPI L/S 1: CPT | Performed by: PHYSICAL MEDICINE & REHABILITATION

## 2020-09-25 PROCEDURE — 64484 NJX AA&/STRD TFRM EPI L/S EA: CPT | Performed by: PHYSICAL MEDICINE & REHABILITATION

## 2020-09-25 RX ORDER — LIDOCAINE HYDROCHLORIDE 10 MG/ML
5 INJECTION, SOLUTION EPIDURAL; INFILTRATION; INTRACAUDAL; PERINEURAL ONCE
Status: COMPLETED | OUTPATIENT
Start: 2020-09-25 | End: 2020-09-25

## 2020-09-25 RX ORDER — PAPAVERINE HCL 150 MG
20 CAPSULE, EXTENDED RELEASE ORAL ONCE
Status: COMPLETED | OUTPATIENT
Start: 2020-09-25 | End: 2020-09-25

## 2020-09-25 RX ADMIN — LIDOCAINE HYDROCHLORIDE 1.5 ML: 10 INJECTION, SOLUTION EPIDURAL; INFILTRATION; INTRACAUDAL; PERINEURAL at 11:20

## 2020-09-25 RX ADMIN — IOHEXOL 2 ML: 300 INJECTION, SOLUTION INTRAVENOUS at 11:22

## 2020-09-25 RX ADMIN — DEXAMETHASONE SODIUM PHOSPHATE 15 MG: 10 INJECTION, SOLUTION INTRAMUSCULAR; INTRAVENOUS at 11:22

## 2020-09-25 NOTE — H&P
History of Present Illness: The patient is a 35 y o  male who presents with complaints of Left back and leg pain    There is no problem list on file for this patient  Past Medical History:   Diagnosis Date    Chronic pain disorder     Depression     Low back pain        No past surgical history on file  Current Outpatient Medications:     ibuprofen (MOTRIN) 200 mg tablet, Take 600 mg by mouth every 6 (six) hours as needed, Disp: , Rfl:     meloxicam (MOBIC) 15 mg tablet, Take 1 tablet (15 mg total) by mouth daily (Patient not taking: Reported on 9/3/2020), Disp: 30 tablet, Rfl: 0    Current Facility-Administered Medications:     dexamethasone (PF) (DECADRON) injection 20 mg, 20 mg, Epidural, Once, Martina Heys, DO    iohexol (OMNIPAQUE) 300 mg/mL injection 50 mL, 50 mL, Epidural, Once, Martina Heys, DO    lidocaine (PF) (XYLOCAINE-MPF) 1 % injection 5 mL, 5 mL, Infiltration, Once, Martina Heys, DO    No Known Allergies    Physical Exam:   Vitals:    09/25/20 1104   BP: 102/67   Pulse: 79   Resp: 20   Temp: 98 2 °F (36 8 °C)   SpO2: 95%     General: Awake, Alert, Oriented x 3, Mood and affect appropriate  Respiratory: Respirations even and unlabored  Cardiovascular: Peripheral pulses intact; no edema  Musculoskeletal Exam:   Left back and leg pain    ASA Score:  2  Patient/Chart Verification  Patient ID Verified: Verbal  ID Band Applied: No  Consents Confirmed: Procedural, To be obtained in the Pre-Procedure area  H&P( within 30 days) Verified: To be obtained in the Pre-Procedure area  Interval H&P(within 24 hr) Complete (required for Outpatients and Surgery Admit only): To be obtained in the Pre-Procedure area  Allergies Reviewed: Yes  Anticoag/NSAID held?: NA  Currently on antibiotics?: No    Assessment:   1  Lumbar radiculitis    2   Chronic left-sided low back pain with left-sided sciatica        Plan: (L) L5-S1 and (L) S1 TFESI

## 2020-09-25 NOTE — DISCHARGE INSTRUCTIONS
Epidural Steroid Injection   WHAT YOU NEED TO KNOW:   An epidural steroid injection (RICHAR) is a procedure to inject steroid medicine into the epidural space  The epidural space is between your spinal cord and vertebrae  Steroids reduce inflammation and fluid buildup in your spine that may be causing pain  You may be given pain medicine along with the steroids  ACTIVITY  · Do not drive or operate machinery today  · No strenuous activity today - bending, lifting, etc   · You may resume normal activites starting tomorrow - start slowly and as tolerated  · You may shower today, but no tub baths or hot tubs  · You may have numbness for several hours from the local anesthetic  Please use caution and common sense, especially with weight-bearing activities  CARE OF THE INJECTION SITE  · If you have soreness or pain, apply ice to the area today (20 minutes on/20 minutes off)  · Starting tomorrow, you may use warm, moist heat or ice if needed  · You may have an increase or change in your discomfort for 36-48 hours after your treatment  · Apply ice and continue with any pain medication you have been prescribed  · Notify [facility] if you have any of the following: redness, drainage, swelling, headache, stiff neck or fever above 100°F     SPECIAL INSTRUCTIONS  · Our office will contact you in approximately 7 days for a progress report  MEDICATIONS  · Continue to take all routine medications  · Our office may have instructed you to hold some medications  If you have a problem specifically related to your procedure, please call our office at [phone]  Problems not related to your procedure should be directed to your primary care physician

## 2020-10-02 ENCOUNTER — TELEPHONE (OUTPATIENT)
Dept: PAIN MEDICINE | Facility: CLINIC | Age: 40
End: 2020-10-02

## 2020-10-14 ENCOUNTER — TELEPHONE (OUTPATIENT)
Dept: OBGYN CLINIC | Facility: HOSPITAL | Age: 40
End: 2020-10-14

## 2020-10-23 ENCOUNTER — OFFICE VISIT (OUTPATIENT)
Dept: PAIN MEDICINE | Facility: MEDICAL CENTER | Age: 40
End: 2020-10-23
Payer: OTHER MISCELLANEOUS

## 2020-10-23 VITALS
DIASTOLIC BLOOD PRESSURE: 83 MMHG | SYSTOLIC BLOOD PRESSURE: 124 MMHG | BODY MASS INDEX: 29.82 KG/M2 | HEART RATE: 73 BPM | WEIGHT: 225 LBS | HEIGHT: 73 IN | TEMPERATURE: 97.4 F

## 2020-10-23 DIAGNOSIS — M54.50 CHRONIC BILATERAL LOW BACK PAIN WITHOUT SCIATICA: ICD-10-CM

## 2020-10-23 DIAGNOSIS — M79.18 MYOFASCIAL PAIN SYNDROME: ICD-10-CM

## 2020-10-23 DIAGNOSIS — M46.1 SACROILIITIS (HCC): ICD-10-CM

## 2020-10-23 DIAGNOSIS — G89.29 CHRONIC BILATERAL LOW BACK PAIN WITHOUT SCIATICA: ICD-10-CM

## 2020-10-23 DIAGNOSIS — G89.4 CHRONIC PAIN SYNDROME: Primary | ICD-10-CM

## 2020-10-23 DIAGNOSIS — M54.16 LUMBAR RADICULITIS: ICD-10-CM

## 2020-10-23 PROCEDURE — 99213 OFFICE O/P EST LOW 20 MIN: CPT | Performed by: NURSE PRACTITIONER

## 2020-10-23 RX ORDER — TIZANIDINE 4 MG/1
4 TABLET ORAL EVERY 8 HOURS PRN
Qty: 90 TABLET | Refills: 0 | Status: SHIPPED | OUTPATIENT
Start: 2020-10-23 | End: 2020-12-04

## 2020-10-26 ENCOUNTER — OFFICE VISIT (OUTPATIENT)
Dept: OBGYN CLINIC | Facility: MEDICAL CENTER | Age: 40
End: 2020-10-26
Payer: OTHER MISCELLANEOUS

## 2020-10-26 VITALS
BODY MASS INDEX: 26.67 KG/M2 | DIASTOLIC BLOOD PRESSURE: 77 MMHG | HEART RATE: 66 BPM | WEIGHT: 219 LBS | HEIGHT: 76 IN | SYSTOLIC BLOOD PRESSURE: 119 MMHG

## 2020-10-26 DIAGNOSIS — G89.29 CHRONIC LEFT-SIDED LOW BACK PAIN WITH LEFT-SIDED SCIATICA: Primary | ICD-10-CM

## 2020-10-26 DIAGNOSIS — M54.42 CHRONIC LEFT-SIDED LOW BACK PAIN WITH LEFT-SIDED SCIATICA: Primary | ICD-10-CM

## 2020-10-26 DIAGNOSIS — M51.26 BULGING LUMBAR DISC: ICD-10-CM

## 2020-10-26 DIAGNOSIS — R29.898 LEFT LEG WEAKNESS: ICD-10-CM

## 2020-10-26 PROCEDURE — 99213 OFFICE O/P EST LOW 20 MIN: CPT | Performed by: EMERGENCY MEDICINE

## 2020-10-27 ENCOUNTER — TELEPHONE (OUTPATIENT)
Dept: OBGYN CLINIC | Facility: HOSPITAL | Age: 40
End: 2020-10-27

## 2020-11-06 ENCOUNTER — HOSPITAL ENCOUNTER (OUTPATIENT)
Dept: RADIOLOGY | Facility: MEDICAL CENTER | Age: 40
Discharge: HOME | End: 2020-11-06
Attending: PHYSICAL MEDICINE & REHABILITATION | Admitting: PHYSICAL MEDICINE & REHABILITATION
Payer: OTHER MISCELLANEOUS

## 2020-11-06 VITALS
HEART RATE: 72 BPM | OXYGEN SATURATION: 97 % | SYSTOLIC BLOOD PRESSURE: 120 MMHG | TEMPERATURE: 98.2 F | RESPIRATION RATE: 20 BRPM | DIASTOLIC BLOOD PRESSURE: 72 MMHG

## 2020-11-06 DIAGNOSIS — G89.4 CHRONIC PAIN SYNDROME: ICD-10-CM

## 2020-11-06 DIAGNOSIS — M54.50 CHRONIC BILATERAL LOW BACK PAIN WITHOUT SCIATICA: ICD-10-CM

## 2020-11-06 DIAGNOSIS — M54.16 LUMBAR RADICULITIS: ICD-10-CM

## 2020-11-06 DIAGNOSIS — G89.29 CHRONIC BILATERAL LOW BACK PAIN WITHOUT SCIATICA: ICD-10-CM

## 2020-11-06 PROCEDURE — 27096 INJECT SACROILIAC JOINT: CPT | Performed by: PHYSICAL MEDICINE & REHABILITATION

## 2020-11-06 RX ORDER — METHYLPREDNISOLONE ACETATE 40 MG/ML
80 INJECTION, SUSPENSION INTRA-ARTICULAR; INTRALESIONAL; INTRAMUSCULAR; PARENTERAL; SOFT TISSUE ONCE
Status: COMPLETED | OUTPATIENT
Start: 2020-11-06 | End: 2020-11-06

## 2020-11-06 RX ORDER — BUPIVACAINE HCL/PF 2.5 MG/ML
10 VIAL (ML) INJECTION ONCE
Status: COMPLETED | OUTPATIENT
Start: 2020-11-06 | End: 2020-11-06

## 2020-11-06 RX ADMIN — METHYLPREDNISOLONE ACETATE 80 MG: 40 INJECTION, SUSPENSION INTRA-ARTICULAR; INTRALESIONAL; INTRAMUSCULAR; SOFT TISSUE at 11:49

## 2020-11-06 RX ADMIN — IOHEXOL 1 ML: 300 INJECTION, SOLUTION INTRAVENOUS at 11:48

## 2020-11-06 RX ADMIN — Medication 3 ML: at 11:49

## 2020-11-13 ENCOUNTER — TELEPHONE (OUTPATIENT)
Dept: PAIN MEDICINE | Facility: CLINIC | Age: 40
End: 2020-11-13

## 2020-11-23 ENCOUNTER — OFFICE VISIT (OUTPATIENT)
Dept: OBGYN CLINIC | Facility: MEDICAL CENTER | Age: 40
End: 2020-11-23
Payer: OTHER MISCELLANEOUS

## 2020-11-23 VITALS
SYSTOLIC BLOOD PRESSURE: 138 MMHG | DIASTOLIC BLOOD PRESSURE: 88 MMHG | HEIGHT: 76 IN | BODY MASS INDEX: 26.67 KG/M2 | WEIGHT: 219 LBS | HEART RATE: 73 BPM

## 2020-11-23 DIAGNOSIS — R29.898 LEFT LEG WEAKNESS: ICD-10-CM

## 2020-11-23 DIAGNOSIS — M54.42 CHRONIC LEFT-SIDED LOW BACK PAIN WITH LEFT-SIDED SCIATICA: Primary | ICD-10-CM

## 2020-11-23 DIAGNOSIS — G89.29 CHRONIC LEFT-SIDED LOW BACK PAIN WITH LEFT-SIDED SCIATICA: Primary | ICD-10-CM

## 2020-11-23 DIAGNOSIS — M46.1 SACROILIITIS (HCC): ICD-10-CM

## 2020-11-23 DIAGNOSIS — M51.26 BULGING LUMBAR DISC: ICD-10-CM

## 2020-11-23 PROCEDURE — 99213 OFFICE O/P EST LOW 20 MIN: CPT | Performed by: EMERGENCY MEDICINE

## 2020-11-24 ENCOUNTER — TELEPHONE (OUTPATIENT)
Dept: OBGYN CLINIC | Facility: MEDICAL CENTER | Age: 40
End: 2020-11-24

## 2020-12-04 ENCOUNTER — OFFICE VISIT (OUTPATIENT)
Dept: OBGYN CLINIC | Facility: MEDICAL CENTER | Age: 40
End: 2020-12-04
Payer: OTHER MISCELLANEOUS

## 2020-12-04 ENCOUNTER — OFFICE VISIT (OUTPATIENT)
Dept: PAIN MEDICINE | Facility: MEDICAL CENTER | Age: 40
End: 2020-12-04
Payer: OTHER MISCELLANEOUS

## 2020-12-04 VITALS
TEMPERATURE: 97 F | DIASTOLIC BLOOD PRESSURE: 86 MMHG | HEIGHT: 73 IN | SYSTOLIC BLOOD PRESSURE: 133 MMHG | BODY MASS INDEX: 29.16 KG/M2 | WEIGHT: 220 LBS | HEART RATE: 85 BPM

## 2020-12-04 VITALS
SYSTOLIC BLOOD PRESSURE: 123 MMHG | HEIGHT: 73 IN | HEART RATE: 79 BPM | BODY MASS INDEX: 29.16 KG/M2 | TEMPERATURE: 97 F | DIASTOLIC BLOOD PRESSURE: 76 MMHG | WEIGHT: 220 LBS

## 2020-12-04 DIAGNOSIS — M54.42 CHRONIC LEFT-SIDED LOW BACK PAIN WITH LEFT-SIDED SCIATICA: ICD-10-CM

## 2020-12-04 DIAGNOSIS — M46.1 SACROILIITIS (HCC): ICD-10-CM

## 2020-12-04 DIAGNOSIS — M54.16 LUMBAR RADICULITIS: ICD-10-CM

## 2020-12-04 DIAGNOSIS — M54.42 CHRONIC LEFT-SIDED LOW BACK PAIN WITH LEFT-SIDED SCIATICA: Primary | ICD-10-CM

## 2020-12-04 DIAGNOSIS — R29.898 LEFT LEG WEAKNESS: ICD-10-CM

## 2020-12-04 DIAGNOSIS — M51.26 BULGING LUMBAR DISC: ICD-10-CM

## 2020-12-04 DIAGNOSIS — G89.4 CHRONIC PAIN SYNDROME: Primary | ICD-10-CM

## 2020-12-04 DIAGNOSIS — G89.29 CHRONIC LEFT-SIDED LOW BACK PAIN WITH LEFT-SIDED SCIATICA: Primary | ICD-10-CM

## 2020-12-04 DIAGNOSIS — G89.29 CHRONIC LEFT-SIDED LOW BACK PAIN WITH LEFT-SIDED SCIATICA: ICD-10-CM

## 2020-12-04 PROCEDURE — 99213 OFFICE O/P EST LOW 20 MIN: CPT | Performed by: NURSE PRACTITIONER

## 2020-12-04 PROCEDURE — 99213 OFFICE O/P EST LOW 20 MIN: CPT | Performed by: EMERGENCY MEDICINE

## 2020-12-07 ENCOUNTER — TELEPHONE (OUTPATIENT)
Dept: OBGYN CLINIC | Facility: HOSPITAL | Age: 40
End: 2020-12-07

## 2020-12-18 ENCOUNTER — CONSULT (OUTPATIENT)
Dept: NEUROSURGERY | Facility: CLINIC | Age: 40
End: 2020-12-18
Payer: OTHER MISCELLANEOUS

## 2020-12-18 VITALS
HEIGHT: 73 IN | DIASTOLIC BLOOD PRESSURE: 90 MMHG | BODY MASS INDEX: 29.82 KG/M2 | WEIGHT: 225 LBS | TEMPERATURE: 97.8 F | SYSTOLIC BLOOD PRESSURE: 140 MMHG

## 2020-12-18 DIAGNOSIS — G89.4 CHRONIC PAIN SYNDROME: ICD-10-CM

## 2020-12-18 DIAGNOSIS — M54.16 LUMBAR RADICULITIS: ICD-10-CM

## 2020-12-18 DIAGNOSIS — G89.29 CHRONIC LEFT-SIDED LOW BACK PAIN WITH LEFT-SIDED SCIATICA: ICD-10-CM

## 2020-12-18 DIAGNOSIS — M54.42 CHRONIC LEFT-SIDED LOW BACK PAIN WITH LEFT-SIDED SCIATICA: ICD-10-CM

## 2020-12-18 PROCEDURE — 99244 OFF/OP CNSLTJ NEW/EST MOD 40: CPT | Performed by: NEUROLOGICAL SURGERY

## 2020-12-18 RX ORDER — CHLORHEXIDINE GLUCONATE 0.12 MG/ML
15 RINSE ORAL ONCE
Status: CANCELLED | OUTPATIENT
Start: 2020-12-18 | End: 2020-12-18

## 2020-12-18 RX ORDER — GABAPENTIN 300 MG/1
300 CAPSULE ORAL ONCE
Status: CANCELLED | OUTPATIENT
Start: 2020-12-18 | End: 2020-12-18

## 2020-12-18 RX ORDER — CEFAZOLIN SODIUM 2 G/50ML
2000 SOLUTION INTRAVENOUS ONCE
Status: CANCELLED | OUTPATIENT
Start: 2020-12-18 | End: 2020-12-18

## 2020-12-18 RX ORDER — ACETAMINOPHEN 325 MG/1
975 TABLET ORAL ONCE
Status: CANCELLED | OUTPATIENT
Start: 2020-12-18 | End: 2020-12-18

## 2020-12-28 ENCOUNTER — LAB (OUTPATIENT)
Dept: LAB | Facility: HOSPITAL | Age: 40
End: 2020-12-28
Attending: NEUROLOGICAL SURGERY
Payer: OTHER MISCELLANEOUS

## 2020-12-28 ENCOUNTER — HOSPITAL ENCOUNTER (OUTPATIENT)
Dept: NON INVASIVE DIAGNOSTICS | Facility: HOSPITAL | Age: 40
Discharge: HOME | End: 2020-12-28
Attending: NEUROLOGICAL SURGERY
Payer: OTHER MISCELLANEOUS

## 2020-12-28 DIAGNOSIS — M54.16 LUMBAR RADICULITIS: ICD-10-CM

## 2020-12-28 DIAGNOSIS — G89.29 CHRONIC LEFT-SIDED LOW BACK PAIN WITH LEFT-SIDED SCIATICA: ICD-10-CM

## 2020-12-28 DIAGNOSIS — Z01.818 PRE-PROCEDURAL EXAMINATION: ICD-10-CM

## 2020-12-28 DIAGNOSIS — M54.42 CHRONIC LEFT-SIDED LOW BACK PAIN WITH LEFT-SIDED SCIATICA: ICD-10-CM

## 2020-12-28 LAB
ALBUMIN SERPL BCP-MCNC: 3.9 G/DL (ref 3.5–5)
ALP SERPL-CCNC: 53 U/L (ref 46–116)
ALT SERPL W P-5'-P-CCNC: 263 U/L (ref 12–78)
ANION GAP SERPL CALCULATED.3IONS-SCNC: 5 MMOL/L (ref 4–13)
APTT PPP: 26 SECONDS (ref 23–37)
AST SERPL W P-5'-P-CCNC: 92 U/L (ref 5–45)
ATRIAL RATE: 76 BPM
BASOPHILS # BLD AUTO: 0.04 THOUSANDS/ΜL (ref 0–0.1)
BASOPHILS NFR BLD AUTO: 1 % (ref 0–1)
BILIRUB SERPL-MCNC: 0.51 MG/DL (ref 0.2–1)
BUN SERPL-MCNC: 12 MG/DL (ref 5–25)
CALCIUM SERPL-MCNC: 9.1 MG/DL (ref 8.3–10.1)
CHLORIDE SERPL-SCNC: 102 MMOL/L (ref 100–108)
CO2 SERPL-SCNC: 31 MMOL/L (ref 21–32)
CREAT SERPL-MCNC: 1.17 MG/DL (ref 0.6–1.3)
EOSINOPHIL # BLD AUTO: 0.29 THOUSAND/ΜL (ref 0–0.61)
EOSINOPHIL NFR BLD AUTO: 4 % (ref 0–6)
ERYTHROCYTE [DISTWIDTH] IN BLOOD BY AUTOMATED COUNT: 12.6 % (ref 11.6–15.1)
EST. AVERAGE GLUCOSE BLD GHB EST-MCNC: 128 MG/DL
GFR SERPL CREATININE-BSD FRML MDRD: 77 ML/MIN/1.73SQ M
GLUCOSE SERPL-MCNC: 157 MG/DL (ref 65–140)
HBA1C MFR BLD: 6.1 %
HCT VFR BLD AUTO: 45.5 % (ref 36.5–49.3)
HGB BLD-MCNC: 15.2 G/DL (ref 12–17)
IMM GRANULOCYTES # BLD AUTO: 0.03 THOUSAND/UL (ref 0–0.2)
IMM GRANULOCYTES NFR BLD AUTO: 0 % (ref 0–2)
INR PPP: 1.01 (ref 0.84–1.19)
LYMPHOCYTES # BLD AUTO: 2.27 THOUSANDS/ΜL (ref 0.6–4.47)
LYMPHOCYTES NFR BLD AUTO: 31 % (ref 14–44)
MCH RBC QN AUTO: 30.2 PG (ref 26.8–34.3)
MCHC RBC AUTO-ENTMCNC: 33.4 G/DL (ref 31.4–37.4)
MCV RBC AUTO: 90 FL (ref 82–98)
MONOCYTES # BLD AUTO: 0.43 THOUSAND/ΜL (ref 0.17–1.22)
MONOCYTES NFR BLD AUTO: 6 % (ref 4–12)
NEUTROPHILS # BLD AUTO: 4.36 THOUSANDS/ΜL (ref 1.85–7.62)
NEUTS SEG NFR BLD AUTO: 58 % (ref 43–75)
NRBC BLD AUTO-RTO: 0 /100 WBCS
P AXIS: 40 DEGREES
PLATELET # BLD AUTO: 162 THOUSANDS/UL (ref 149–390)
PMV BLD AUTO: 10.8 FL (ref 8.9–12.7)
POTASSIUM SERPL-SCNC: 3.8 MMOL/L (ref 3.5–5.3)
PR INTERVAL: 118 MS
PROT SERPL-MCNC: 8 G/DL (ref 6.4–8.2)
PROTHROMBIN TIME: 13.1 SECONDS (ref 11.6–14.5)
QRS AXIS: 59 DEGREES
QRSD INTERVAL: 84 MS
QT INTERVAL: 358 MS
QTC INTERVAL: 402 MS
RBC # BLD AUTO: 5.04 MILLION/UL (ref 3.88–5.62)
SODIUM SERPL-SCNC: 138 MMOL/L (ref 136–145)
T WAVE AXIS: 3 DEGREES
VENTRICULAR RATE: 76 BPM
WBC # BLD AUTO: 7.42 THOUSAND/UL (ref 4.31–10.16)

## 2020-12-28 PROCEDURE — 85025 COMPLETE CBC W/AUTO DIFF WBC: CPT

## 2020-12-28 PROCEDURE — 83036 HEMOGLOBIN GLYCOSYLATED A1C: CPT

## 2020-12-28 PROCEDURE — 36415 COLL VENOUS BLD VENIPUNCTURE: CPT

## 2020-12-28 PROCEDURE — 85610 PROTHROMBIN TIME: CPT

## 2020-12-28 PROCEDURE — 85730 THROMBOPLASTIN TIME PARTIAL: CPT

## 2020-12-28 PROCEDURE — 80053 COMPREHEN METABOLIC PANEL: CPT

## 2020-12-28 PROCEDURE — 93005 ELECTROCARDIOGRAM TRACING: CPT

## 2021-01-04 ENCOUNTER — OFFICE VISIT (OUTPATIENT)
Dept: OBGYN CLINIC | Facility: MEDICAL CENTER | Age: 41
End: 2021-01-04
Payer: OTHER MISCELLANEOUS

## 2021-01-04 VITALS
WEIGHT: 220 LBS | HEART RATE: 76 BPM | HEIGHT: 73 IN | SYSTOLIC BLOOD PRESSURE: 145 MMHG | DIASTOLIC BLOOD PRESSURE: 78 MMHG | BODY MASS INDEX: 29.16 KG/M2

## 2021-01-04 DIAGNOSIS — M54.42 CHRONIC LEFT-SIDED LOW BACK PAIN WITH LEFT-SIDED SCIATICA: Primary | ICD-10-CM

## 2021-01-04 DIAGNOSIS — M46.1 SACROILIITIS (HCC): ICD-10-CM

## 2021-01-04 DIAGNOSIS — G89.29 CHRONIC LEFT-SIDED LOW BACK PAIN WITH LEFT-SIDED SCIATICA: Primary | ICD-10-CM

## 2021-01-04 DIAGNOSIS — M51.26 BULGING LUMBAR DISC: ICD-10-CM

## 2021-01-04 DIAGNOSIS — R29.898 LEFT LEG WEAKNESS: ICD-10-CM

## 2021-01-04 PROCEDURE — 99213 OFFICE O/P EST LOW 20 MIN: CPT | Performed by: EMERGENCY MEDICINE

## 2021-01-04 NOTE — LETTER
January 4, 2021     Patient: Erica Aden   YOB: 1980   Date of Visit:        To Whom it May Concern:    Erica Aden is under my professional care  He may return to restricted duty:  No prolonged sitting/standing/walking greater than 30 minutes to 1 hour unless patient tolerates  Please allow sitting and standing breaks as needed  Max lifting 5 lbs  No bending/twisting/squatting  Schedule for Neurosurgery  Further work restrictions per [facility] after surgery  F/U with Neurosurgery as scheduled  F/U with Dr Jess Gonzalez or [facility] as needed    If you have any questions or concerns, please don't hesitate to call           Sincerely,          Mark Narayan MD        CC: No Recipients

## 2021-01-04 NOTE — H&P (VIEW-ONLY)
Assessment/Plan:    Diagnoses and all orders for this visit:    Chronic left-sided low back pain with left-sided sciatica    Left leg weakness    Bulging lumbar disc    Sacroiliitis (HCC)    F/U for surgery as scheduled  Neurosurgery will take over work status after surgery  Return if symptoms worsen or fail to improve  Chief Complaint:     Chief Complaint   Patient presents with    Spine - Follow-up       Subjective:   Patient ID: Sheldon Isaac is a 38 y o  male  WC DOI 12/2019  Patient has been evaluated by Neurosurgery and it is planned patient will undergo left L4-5 minimally invasive microdiskectomy and possible epidural steroid injection  Symptoms stable    Previous note:  Patient returns for work status determination for back pain s/p left-sided L5-S1 and S1 transforaminal epidural steroid injection on 09/25/2020 and B/L SI joint steroid injections, he has been referred to Neurosurgery  Review of Systems    The following portions of the patient's chart were reviewed and updated as appropriate: Allergy:  No Known Allergies      Past Medical History:   Diagnosis Date    Chronic pain disorder     Depression     Low back pain     Sleep apnea        History reviewed  No pertinent surgical history      Social History     Socioeconomic History    Marital status: Single     Spouse name: Not on file    Number of children: Not on file    Years of education: Not on file    Highest education level: Not on file   Occupational History    Not on file   Social Needs    Financial resource strain: Not on file    Food insecurity     Worry: Not on file     Inability: Not on file    Transportation needs     Medical: Not on file     Non-medical: Not on file   Tobacco Use    Smoking status: Never Smoker    Smokeless tobacco: Never Used   Substance and Sexual Activity    Alcohol use: Not Currently    Drug use: Not Currently    Sexual activity: Not on file   Lifestyle    Physical activity Days per week: Not on file     Minutes per session: Not on file    Stress: Not on file   Relationships    Social connections     Talks on phone: Not on file     Gets together: Not on file     Attends Restorationism service: Not on file     Active member of club or organization: Not on file     Attends meetings of clubs or organizations: Not on file     Relationship status: Not on file    Intimate partner violence     Fear of current or ex partner: Not on file     Emotionally abused: Not on file     Physically abused: Not on file     Forced sexual activity: Not on file   Other Topics Concern    Not on file   Social History Narrative    Not on file       Family History   Problem Relation Age of Onset    No Known Problems Mother     No Known Problems Father        Medications:    Current Outpatient Medications:     ibuprofen (MOTRIN) 200 mg tablet, Take 600 mg by mouth every 6 (six) hours as needed, Disp: , Rfl:     Patient Active Problem List   Diagnosis    Lumbar radiculitis    Chronic left-sided low back pain with left-sided sciatica    Chronic pain syndrome    Sacroiliitis (HCC)       Objective:  /78   Pulse 76   Ht 6' 1" (1 854 m)   Wt 99 8 kg (220 lb)   BMI 29 03 kg/m²     Back Exam     Other   Gait: antalgic             Physical Exam  Vitals signs reviewed  Constitutional:       Appearance: He is well-developed  HENT:      Head: Normocephalic and atraumatic  Eyes:      Conjunctiva/sclera: Conjunctivae normal    Neck:      Musculoskeletal: Neck supple  Pulmonary:      Effort: Pulmonary effort is normal    Skin:     General: Skin is warm and dry  Neurological:      Mental Status: He is alert and oriented to person, place, and time  Psychiatric:         Behavior: Behavior normal            Neurologic Exam     Mental Status   Oriented to person, place, and time  Procedures    I have personally reviewed the written report of the pertinent studies

## 2021-01-04 NOTE — PROGRESS NOTES
Assessment/Plan:    Diagnoses and all orders for this visit:    Chronic left-sided low back pain with left-sided sciatica    Left leg weakness    Bulging lumbar disc    Sacroiliitis (HCC)    F/U for surgery as scheduled  Neurosurgery will take over work status after surgery  Return if symptoms worsen or fail to improve  Chief Complaint:     Chief Complaint   Patient presents with    Spine - Follow-up       Subjective:   Patient ID: Erica Aden is a 38 y o  male  WC DOI 12/2019  Patient has been evaluated by Neurosurgery and it is planned patient will undergo left L4-5 minimally invasive microdiskectomy and possible epidural steroid injection  Symptoms stable    Previous note:  Patient returns for work status determination for back pain s/p left-sided L5-S1 and S1 transforaminal epidural steroid injection on 09/25/2020 and B/L SI joint steroid injections, he has been referred to Neurosurgery  Review of Systems    The following portions of the patient's chart were reviewed and updated as appropriate: Allergy:  No Known Allergies      Past Medical History:   Diagnosis Date    Chronic pain disorder     Depression     Low back pain     Sleep apnea        History reviewed  No pertinent surgical history      Social History     Socioeconomic History    Marital status: Single     Spouse name: Not on file    Number of children: Not on file    Years of education: Not on file    Highest education level: Not on file   Occupational History    Not on file   Social Needs    Financial resource strain: Not on file    Food insecurity     Worry: Not on file     Inability: Not on file    Transportation needs     Medical: Not on file     Non-medical: Not on file   Tobacco Use    Smoking status: Never Smoker    Smokeless tobacco: Never Used   Substance and Sexual Activity    Alcohol use: Not Currently    Drug use: Not Currently    Sexual activity: Not on file   Lifestyle    Physical activity Days per week: Not on file     Minutes per session: Not on file    Stress: Not on file   Relationships    Social connections     Talks on phone: Not on file     Gets together: Not on file     Attends Amish service: Not on file     Active member of club or organization: Not on file     Attends meetings of clubs or organizations: Not on file     Relationship status: Not on file    Intimate partner violence     Fear of current or ex partner: Not on file     Emotionally abused: Not on file     Physically abused: Not on file     Forced sexual activity: Not on file   Other Topics Concern    Not on file   Social History Narrative    Not on file       Family History   Problem Relation Age of Onset    No Known Problems Mother     No Known Problems Father        Medications:    Current Outpatient Medications:     ibuprofen (MOTRIN) 200 mg tablet, Take 600 mg by mouth every 6 (six) hours as needed, Disp: , Rfl:     Patient Active Problem List   Diagnosis    Lumbar radiculitis    Chronic left-sided low back pain with left-sided sciatica    Chronic pain syndrome    Sacroiliitis (HCC)       Objective:  /78   Pulse 76   Ht 6' 1" (1 854 m)   Wt 99 8 kg (220 lb)   BMI 29 03 kg/m²     Back Exam     Other   Gait: antalgic             Physical Exam  Vitals signs reviewed  Constitutional:       Appearance: He is well-developed  HENT:      Head: Normocephalic and atraumatic  Eyes:      Conjunctiva/sclera: Conjunctivae normal    Neck:      Musculoskeletal: Neck supple  Pulmonary:      Effort: Pulmonary effort is normal    Skin:     General: Skin is warm and dry  Neurological:      Mental Status: He is alert and oriented to person, place, and time  Psychiatric:         Behavior: Behavior normal            Neurologic Exam     Mental Status   Oriented to person, place, and time  Procedures    I have personally reviewed the written report of the pertinent studies

## 2021-01-11 ENCOUNTER — ANESTHESIA EVENT (OUTPATIENT)
Dept: PERIOP | Facility: HOSPITAL | Age: 41
End: 2021-01-11
Payer: OTHER MISCELLANEOUS

## 2021-01-11 DIAGNOSIS — Z91.89 AT RISK FOR OBSTRUCTIVE SLEEP APNEA: Primary | ICD-10-CM

## 2021-01-11 NOTE — PRE-PROCEDURE INSTRUCTIONS
Pre-Surgery Instructions:   Medication Instructions    ibuprofen (MOTRIN) 200 mg tablet Instructed patient per Anesthesia Guidelines  stopped for surgery 1/11   Pre procedure instructions reviewed  Verbalizes understanding  NPO after MN  Yes

## 2021-01-12 NOTE — ANESTHESIA PREPROCEDURE EVALUATION
Procedure:  Left L4-5 minimally invasive microdiscectomy and possible epidural steroid injection (Left Spine Lumbar)    Relevant Problems   MUSCULOSKELETAL   (+) Chronic left-sided low back pain with left-sided sciatica   (+) Sacroiliitis (HCC)      NEURO/PSYCH   (+) Chronic pain syndrome      Pain doc: santos  Pain regimen: ibuprofen (has been on mobic before)  Tylenol and gabapentin ordered preop by surgeon  Booked as outpatient surgery

## 2021-01-15 ENCOUNTER — DOCUMENTATION (OUTPATIENT)
Dept: NEUROSURGERY | Facility: CLINIC | Age: 41
End: 2021-01-15

## 2021-01-15 NOTE — PROGRESS NOTES
[place] and [place]  websites were searched for patient that is scheduled for surgery Monday 1/18/2021  No current record exists

## 2021-01-18 ENCOUNTER — HOSPITAL ENCOUNTER (OUTPATIENT)
Facility: HOSPITAL | Age: 41
Setting detail: OUTPATIENT SURGERY
Discharge: HOME | End: 2021-01-18
Attending: NEUROLOGICAL SURGERY | Admitting: NEUROLOGICAL SURGERY
Payer: OTHER MISCELLANEOUS

## 2021-01-18 ENCOUNTER — ANESTHESIA (OUTPATIENT)
Dept: PERIOP | Facility: HOSPITAL | Age: 41
End: 2021-01-18
Payer: OTHER MISCELLANEOUS

## 2021-01-18 ENCOUNTER — APPOINTMENT (OUTPATIENT)
Dept: RADIOLOGY | Facility: HOSPITAL | Age: 41
End: 2021-01-18
Payer: OTHER MISCELLANEOUS

## 2021-01-18 VITALS
WEIGHT: 225 LBS | HEIGHT: 73 IN | HEART RATE: 91 BPM | RESPIRATION RATE: 16 BRPM | DIASTOLIC BLOOD PRESSURE: 82 MMHG | TEMPERATURE: 97 F | OXYGEN SATURATION: 98 % | BODY MASS INDEX: 29.82 KG/M2 | SYSTOLIC BLOOD PRESSURE: 136 MMHG

## 2021-01-18 VITALS — HEART RATE: 83 BPM

## 2021-01-18 DIAGNOSIS — M54.42 CHRONIC LEFT-SIDED LOW BACK PAIN WITH LEFT-SIDED SCIATICA: Primary | ICD-10-CM

## 2021-01-18 DIAGNOSIS — M54.16 LUMBAR RADICULITIS: ICD-10-CM

## 2021-01-18 DIAGNOSIS — G89.29 CHRONIC LEFT-SIDED LOW BACK PAIN WITH LEFT-SIDED SCIATICA: Primary | ICD-10-CM

## 2021-01-18 PROCEDURE — 63030 LAMOT DCMPRN NRV RT 1 LMBR: CPT | Performed by: NEUROLOGICAL SURGERY

## 2021-01-18 PROCEDURE — 72020 X-RAY EXAM OF SPINE 1 VIEW: CPT

## 2021-01-18 RX ORDER — ONDANSETRON 2 MG/ML
4 INJECTION INTRAMUSCULAR; INTRAVENOUS ONCE AS NEEDED
Status: DISCONTINUED | OUTPATIENT
Start: 2021-01-18 | End: 2021-01-18 | Stop reason: HOSPADM

## 2021-01-18 RX ORDER — FENTANYL CITRATE/PF 50 MCG/ML
50 SYRINGE (ML) INJECTION
Status: DISCONTINUED | OUTPATIENT
Start: 2021-01-18 | End: 2021-01-18 | Stop reason: HOSPADM

## 2021-01-18 RX ORDER — METHOCARBAMOL 500 MG/1
500 TABLET, FILM COATED ORAL EVERY 6 HOURS PRN
Status: DISCONTINUED | OUTPATIENT
Start: 2021-01-18 | End: 2021-01-18 | Stop reason: HOSPADM

## 2021-01-18 RX ORDER — NEOSTIGMINE METHYLSULFATE 1 MG/ML
INJECTION INTRAVENOUS AS NEEDED
Status: DISCONTINUED | OUTPATIENT
Start: 2021-01-18 | End: 2021-01-18

## 2021-01-18 RX ORDER — METHYLPREDNISOLONE ACETATE 40 MG/ML
INJECTION, SUSPENSION INTRA-ARTICULAR; INTRALESIONAL; INTRAMUSCULAR; SOFT TISSUE AS NEEDED
Status: DISCONTINUED | OUTPATIENT
Start: 2021-01-18 | End: 2021-01-18 | Stop reason: HOSPADM

## 2021-01-18 RX ORDER — SODIUM CHLORIDE, SODIUM LACTATE, POTASSIUM CHLORIDE, CALCIUM CHLORIDE 600; 310; 30; 20 MG/100ML; MG/100ML; MG/100ML; MG/100ML
75 INJECTION, SOLUTION INTRAVENOUS CONTINUOUS
Status: DISCONTINUED | OUTPATIENT
Start: 2021-01-18 | End: 2021-01-18 | Stop reason: HOSPADM

## 2021-01-18 RX ORDER — LIDOCAINE HYDROCHLORIDE AND EPINEPHRINE 10; 10 MG/ML; UG/ML
INJECTION, SOLUTION INFILTRATION; PERINEURAL AS NEEDED
Status: DISCONTINUED | OUTPATIENT
Start: 2021-01-18 | End: 2021-01-18 | Stop reason: HOSPADM

## 2021-01-18 RX ORDER — SODIUM CHLORIDE, SODIUM LACTATE, POTASSIUM CHLORIDE, CALCIUM CHLORIDE 600; 310; 30; 20 MG/100ML; MG/100ML; MG/100ML; MG/100ML
100 INJECTION, SOLUTION INTRAVENOUS CONTINUOUS
Status: DISCONTINUED | OUTPATIENT
Start: 2021-01-18 | End: 2021-01-18 | Stop reason: HOSPADM

## 2021-01-18 RX ORDER — HYDROMORPHONE HCL/PF 1 MG/ML
0.25 SYRINGE (ML) INJECTION
Status: DISCONTINUED | OUTPATIENT
Start: 2021-01-18 | End: 2021-01-18 | Stop reason: HOSPADM

## 2021-01-18 RX ORDER — CHLORHEXIDINE GLUCONATE 0.12 MG/ML
15 RINSE ORAL ONCE
Status: COMPLETED | OUTPATIENT
Start: 2021-01-18 | End: 2021-01-18

## 2021-01-18 RX ORDER — OXYCODONE HYDROCHLORIDE AND ACETAMINOPHEN 5; 325 MG/1; MG/1
1 TABLET ORAL EVERY 4 HOURS PRN
Status: DISCONTINUED | OUTPATIENT
Start: 2021-01-18 | End: 2021-01-18 | Stop reason: HOSPADM

## 2021-01-18 RX ORDER — ACETAMINOPHEN 325 MG/1
975 TABLET ORAL ONCE
Status: COMPLETED | OUTPATIENT
Start: 2021-01-18 | End: 2021-01-18

## 2021-01-18 RX ORDER — DEXAMETHASONE SODIUM PHOSPHATE 10 MG/ML
INJECTION, SOLUTION INTRAMUSCULAR; INTRAVENOUS AS NEEDED
Status: DISCONTINUED | OUTPATIENT
Start: 2021-01-18 | End: 2021-01-18

## 2021-01-18 RX ORDER — FENTANYL CITRATE 50 UG/ML
INJECTION, SOLUTION INTRAMUSCULAR; INTRAVENOUS AS NEEDED
Status: DISCONTINUED | OUTPATIENT
Start: 2021-01-18 | End: 2021-01-18

## 2021-01-18 RX ORDER — LIDOCAINE HYDROCHLORIDE 10 MG/ML
INJECTION, SOLUTION EPIDURAL; INFILTRATION; INTRACAUDAL; PERINEURAL AS NEEDED
Status: DISCONTINUED | OUTPATIENT
Start: 2021-01-18 | End: 2021-01-18

## 2021-01-18 RX ORDER — BUPIVACAINE HYDROCHLORIDE 5 MG/ML
INJECTION, SOLUTION EPIDURAL; INTRACAUDAL AS NEEDED
Status: DISCONTINUED | OUTPATIENT
Start: 2021-01-18 | End: 2021-01-18 | Stop reason: HOSPADM

## 2021-01-18 RX ORDER — ONDANSETRON 2 MG/ML
INJECTION INTRAMUSCULAR; INTRAVENOUS AS NEEDED
Status: DISCONTINUED | OUTPATIENT
Start: 2021-01-18 | End: 2021-01-18

## 2021-01-18 RX ORDER — ONDANSETRON 2 MG/ML
4 INJECTION INTRAMUSCULAR; INTRAVENOUS EVERY 6 HOURS PRN
Status: DISCONTINUED | OUTPATIENT
Start: 2021-01-18 | End: 2021-01-18 | Stop reason: HOSPADM

## 2021-01-18 RX ORDER — SODIUM CHLORIDE 9 MG/ML
125 INJECTION, SOLUTION INTRAVENOUS CONTINUOUS
Status: DISCONTINUED | OUTPATIENT
Start: 2021-01-18 | End: 2021-01-18 | Stop reason: HOSPADM

## 2021-01-18 RX ORDER — ROCURONIUM BROMIDE 10 MG/ML
INJECTION, SOLUTION INTRAVENOUS AS NEEDED
Status: DISCONTINUED | OUTPATIENT
Start: 2021-01-18 | End: 2021-01-18

## 2021-01-18 RX ORDER — GABAPENTIN 300 MG/1
300 CAPSULE ORAL ONCE
Status: COMPLETED | OUTPATIENT
Start: 2021-01-18 | End: 2021-01-18

## 2021-01-18 RX ORDER — CEFAZOLIN SODIUM 2 G/50ML
2000 SOLUTION INTRAVENOUS ONCE
Status: COMPLETED | OUTPATIENT
Start: 2021-01-18 | End: 2021-01-18

## 2021-01-18 RX ORDER — LIDOCAINE HYDROCHLORIDE 10 MG/ML
0.5 INJECTION, SOLUTION EPIDURAL; INFILTRATION; INTRACAUDAL; PERINEURAL ONCE AS NEEDED
Status: DISCONTINUED | OUTPATIENT
Start: 2021-01-18 | End: 2021-01-18 | Stop reason: HOSPADM

## 2021-01-18 RX ORDER — MIDAZOLAM HYDROCHLORIDE 2 MG/2ML
INJECTION, SOLUTION INTRAMUSCULAR; INTRAVENOUS AS NEEDED
Status: DISCONTINUED | OUTPATIENT
Start: 2021-01-18 | End: 2021-01-18

## 2021-01-18 RX ORDER — SUCCINYLCHOLINE/SOD CL,ISO/PF 100 MG/5ML
SYRINGE (ML) INTRAVENOUS AS NEEDED
Status: DISCONTINUED | OUTPATIENT
Start: 2021-01-18 | End: 2021-01-18

## 2021-01-18 RX ORDER — EPHEDRINE SULFATE 50 MG/ML
INJECTION INTRAVENOUS AS NEEDED
Status: DISCONTINUED | OUTPATIENT
Start: 2021-01-18 | End: 2021-01-18

## 2021-01-18 RX ORDER — OXYCODONE HYDROCHLORIDE AND ACETAMINOPHEN 5; 325 MG/1; MG/1
1 TABLET ORAL EVERY 6 HOURS PRN
Qty: 12 TABLET | Refills: 0 | Status: SHIPPED | OUTPATIENT
Start: 2021-01-18 | End: 2021-01-21

## 2021-01-18 RX ORDER — PROPOFOL 10 MG/ML
INJECTION, EMULSION INTRAVENOUS AS NEEDED
Status: DISCONTINUED | OUTPATIENT
Start: 2021-01-18 | End: 2021-01-18

## 2021-01-18 RX ORDER — IBUPROFEN 400 MG/1
400 TABLET ORAL EVERY 6 HOURS PRN
Status: DISCONTINUED | OUTPATIENT
Start: 2021-01-18 | End: 2021-01-18 | Stop reason: HOSPADM

## 2021-01-18 RX ORDER — GLYCOPYRROLATE 0.2 MG/ML
INJECTION INTRAMUSCULAR; INTRAVENOUS AS NEEDED
Status: DISCONTINUED | OUTPATIENT
Start: 2021-01-18 | End: 2021-01-18

## 2021-01-18 RX ADMIN — LIDOCAINE HYDROCHLORIDE 100 MG: 10 INJECTION, SOLUTION EPIDURAL; INFILTRATION; INTRACAUDAL; PERINEURAL at 09:44

## 2021-01-18 RX ADMIN — PHENYLEPHRINE HYDROCHLORIDE 20 MCG/MIN: 10 INJECTION INTRAVENOUS at 10:31

## 2021-01-18 RX ADMIN — DEXAMETHASONE SODIUM PHOSPHATE 5 MG: 10 INJECTION, SOLUTION INTRAMUSCULAR; INTRAVENOUS at 09:58

## 2021-01-18 RX ADMIN — SODIUM CHLORIDE, SODIUM LACTATE, POTASSIUM CHLORIDE, AND CALCIUM CHLORIDE 100 ML/HR: .6; .31; .03; .02 INJECTION, SOLUTION INTRAVENOUS at 08:50

## 2021-01-18 RX ADMIN — PHENYLEPHRINE HYDROCHLORIDE 100 MCG: 10 INJECTION INTRAVENOUS at 10:21

## 2021-01-18 RX ADMIN — Medication 100 MG: at 09:45

## 2021-01-18 RX ADMIN — SODIUM CHLORIDE 125 ML/HR: 0.9 INJECTION, SOLUTION INTRAVENOUS at 11:39

## 2021-01-18 RX ADMIN — GABAPENTIN 300 MG: 300 CAPSULE ORAL at 08:44

## 2021-01-18 RX ADMIN — ACETAMINOPHEN 975 MG: 325 TABLET, FILM COATED ORAL at 08:44

## 2021-01-18 RX ADMIN — CEFAZOLIN SODIUM 2000 MG: 2 SOLUTION INTRAVENOUS at 09:48

## 2021-01-18 RX ADMIN — EPHEDRINE SULFATE 10 MG: 50 INJECTION, SOLUTION INTRAVENOUS at 10:35

## 2021-01-18 RX ADMIN — GLYCOPYRROLATE 0.8 MG: 0.2 INJECTION, SOLUTION INTRAMUSCULAR; INTRAVENOUS at 11:04

## 2021-01-18 RX ADMIN — CHLORHEXIDINE GLUCONATE 0.12% ORAL RINSE 15 ML: 1.2 LIQUID ORAL at 08:44

## 2021-01-18 RX ADMIN — ROCURONIUM BROMIDE 30 MG: 50 INJECTION, SOLUTION INTRAVENOUS at 09:58

## 2021-01-18 RX ADMIN — ONDANSETRON 4 MG: 2 INJECTION INTRAMUSCULAR; INTRAVENOUS at 10:59

## 2021-01-18 RX ADMIN — FENTANYL CITRATE 50 MCG: 50 INJECTION INTRAMUSCULAR; INTRAVENOUS at 09:44

## 2021-01-18 RX ADMIN — MIDAZOLAM 2 MG: 1 INJECTION INTRAMUSCULAR; INTRAVENOUS at 09:36

## 2021-01-18 RX ADMIN — PROPOFOL 200 MG: 10 INJECTION, EMULSION INTRAVENOUS at 09:44

## 2021-01-18 RX ADMIN — NEOSTIGMINE METHYLSULFATE 4 MG: 1 INJECTION INTRAVENOUS at 11:04

## 2021-01-18 NOTE — OP NOTE
OPERATIVE REPORT  PATIENT NAME: Pari Chambers    :  1980  MRN: [id number]  Pt Location: [facility] OR ROOM 17    SURGERY DATE: 2021    Surgeon(s) and Role:     * Sebastian Tobar MD - Primary  Assistant  No qualified residents available  Preop Diagnosis:  Lumbar radiculitis [M54 16]  Chronic left-sided low back pain with left-sided sciatica [M54 42, G89 29]    Post-Op Diagnosis Codes:     * Lumbar radiculitis [M54 16]     * Chronic left-sided low back pain with left-sided sciatica [M54 42, G89 29]    Procedure:  1  Left L4-5 minimally invasive microdiskectomy and epidural steroid injection  2  Operating microscope for microdissection  Specimen(s):  * No specimens in log *    Estimated Blood Loss:   Minimal    Drains:  * No LDAs found *    Anesthesia Type:   General    Operative Indications:  Lumbar radiculitis [M54 16]  Chronic left-sided low back pain with left-sided sciatica [M54 42, G89 29]    Operative Findings:  Left lateral recess stenosis at L4-5 secondary ligamentous hypertrophy and disc herniation  Complications:   None    Procedure and Technique:  Clinical Note:    The goals and alternatives to the procedure described above were discussed with patient  Surgery is intended to decompress neural structures and hopefully improve radicular pain  Weakness, numbness and back pain are less likely to improve  The risks of surgery were described in detail  1  Risk of general anesthetic, with possible cardiac and respiratory complication  There is risk of infection and bleeding  2  Risk of neurological injury with new pain, weakness or numbness or difficulties with bowel and bladder function  The risk of CSF leak was described  3  Possible need for revision surgery in the future  Once all questions were answered to their satisfaction, they asked to proceed with surgery  Operating Room Note    The patient was brought to the operating room and placed under general anesthetic   Once all appropriate lines were in position, the patient was carefully turned in the prone position onto a Chuy frame  Care was taken to ensure that all pressure points were padded and the neck was in a neutral position  AP and lateral fluoroscopy were used to identify midline of the lumbar spine  A mini surgical timeout was undertaken identifying site side and level of surgery  The left the midline was prepped with alcohol swab  A spinal needle was placed under lateral fluoroscopic guidance demonstrated appropriate trajectory to the appropriate lumbar interspace  One percent lidocaine with 100,000 of epinephrine was used to infiltrate the soft tissue as the spinal needle was removed  A 2 cm incision was planned based on the entry point of the needle  The skin was then prepped and draped in usual sterile fashion  A full surgical timeout was undertaken identifying the site, side and level of surgery  The patient received preoperative antibiotics  10 blade was used to incise the skin  Monopolar cautery was used to dissect down and through the muscle fascia  A series of Metrx dilators were placed and a final Metrix retractor was placed with lateral fluoroscopy confirming the L4/5 level  This was also confirmed radiology  The operating microscope was then brought in for microdissection  Under microscopic magnification, monopolar cautery was used to remove the soft tissue off the lamina  A ball bur was used to drill the lamina and expose the underlying ligamentum flavum  The ligamentum flavum was undermined with a curved curette and removed Kerrison punch  I was able to identify the thecal sac and shoulder of the nerve root  A medial facetectomy was undertaken to better expose the lateral aspect of the nerve root  Bipolar cautery and FloSeal were used for hemostasis  With a nerve root retracted behind a suction retractor, I could identify a disc herniation contributing to stenosis in the lateral recess    There also seemed to be stenosis within the left L4-5 foramen  Five 4 cautery was used for hemostasis  Fifteen blade was used to incise the annulus  Upgoing straight and angled pituitary rongeur was used to remove disc material   A moderate-sized fragment was also removed from the foramen  Afterwards the nerve root was pulsating nicely  I could pass a ball hook along the ventral surface of the thecal sac and along the course of the nerve root through the lateral recess  A ball hook could be easily passed into the foramen as well  Valsalva maneuver demonstrated no egress of CSF  Disc space  was used to identify any loose fragments which were then removed with pituitary rongeur  Sangeetha Bachelor 4 was placed in disc space and lateral fluoroscopy was used to reconfirm the level  This instrument was removed  Surgiflo was used for hemostasis  The surgical site was irrigated with antibiotic irrigation  40 mg of Depo-Medrol and placed in the epidural space  The Metrx retractor was removed any bleeding from soft tissue was cauterized with monopolar and bipolar cautery  The surgical site was irrigated copiously with antibiotic irrigation  Vicryl suture was used to approximate the fascia and subcutaneous tissue  Monocryl was used to approximate the skin edges  0 5% Marcaine was used to infiltrate the soft tissue  A Miplex was applied  The count was correct at the end of the case and there were no complications  The patient was carefully transferred onto the operating gurney and extubated  The patient was transferred to the PACU where they were noted to be hemodynamically stable and neurologically unchanged  The results of surgery were discussed with patient and family      I was present for the entire procedure    Patient Disposition:  PACU     SIGNATURE: Denita Morgan MD  DATE: January 18, 2021  TIME: 11:13 AM

## 2021-01-18 NOTE — ANESTHESIA POSTPROCEDURE EVALUATION
Post-Op Assessment Note    CV Status:  Stable    Pain management: adequate     Mental Status:  Awake   Hydration Status:  Euvolemic and stable   PONV Controlled:  None   Airway Patency:  Patent      Post Op Vitals Reviewed: Yes      Staff: Anesthesiologist         No complications documented  /85 (01/18/21 1119)    Temp (!) 97 3 °F (36 3 °C) (01/18/21 1119)    Pulse 100 (01/18/21 1119)   Resp 13 (01/18/21 1119)    SpO2 99 % (01/18/21 1119)      Patient transported to PACU extubated and on supplemental O2  Patient was awake, with stable vital signs  Signout was given to PACU RN

## 2021-01-18 NOTE — DISCHARGE INSTRUCTIONS
Spine Day Surgery Discharge Instructions    Activity:    1  Do not lift more than 20 pounds for 2 weeks  Surgical incision care:    1  Keep dressing in place for 3 days  2  Keep incision dry for 3 days  3  May allow clean water to flow over incision after 3 days  4  Do not immerse the incision in water for 4 weeks  5  After 3 days, incision may be left open to air, but should remain clean  6  Do not apply any creams or ointments to the incision, unless otherwise instructed by [facility]  7  Contact office if increasing redness, drainage, pain or swelling around the incision  Postoperative medication:    1  [employer] will provide pain medication for the first 2 weeks after surgery as coordinated with your pain specialist    2  If [employer] [facility] is providing pain medication, please contact office for questions regarding dosage and modifications  3  If [facility] is not providing pain medication postoperatively, then contact pain specialist for additional instructions and prescriptions  4  Resume antiplatelet/anticoagulation medication 2 days after surgery, unless you notice new neurological symptoms or bleeding from incision  5  Do not operate heavy machinery or vehicles while taking sedating medications

## 2021-01-18 NOTE — INTERVAL H&P NOTE
H&P reviewed  After examining the patient I find no changes in the patients condition since the H&P had been written  Patient personally seen and examined  Neurological examination unchanged compared to last office/progress note, with the following exceptions:    /94   Pulse 83   Temp (!) 96 4 °F (35 8 °C) (Tympanic)   Resp 18   Ht 6' 1" (1 854 m)   Wt 102 kg (225 lb)   SpO2 97%   BMI 29 69 kg/m²      None  No respiratory distress  Regular cardiac rate and rhythm  Has stopped all antiplatelet/anticoagulation medication as instructed  Post operative instructions and medications have been reviewed with the patient  Assessment and Plan:    All questions have been answered to the patient satisfaction  Plan to proceed with left L4-5 minimally invasive microdiskectomy and possible epidural steroid injection  They are in agreement with proceeding

## 2021-01-19 ENCOUNTER — TELEMEDICINE (OUTPATIENT)
Dept: NEUROSURGERY | Facility: CLINIC | Age: 41
End: 2021-01-19

## 2021-01-19 DIAGNOSIS — Z98.890 STATUS POST SURGERY: Primary | ICD-10-CM

## 2021-01-19 PROCEDURE — 99024 POSTOP FOLLOW-UP VISIT: CPT

## 2021-01-19 NOTE — PROGRESS NOTES
Virtual Brief Visit    Assessment/Plan:    Problem List Items Addressed This Visit     None      Visit Diagnoses     Status post surgery    -  Primary                Reason for visit is   Chief Complaint   Patient presents with    Virtual Brief Visit        Encounter provider Sonido Sykes RN    Provider located at [address] [place] [address] [address] [place] [id number]    Recent Visits  No visits were found meeting these conditions  Showing recent visits within past 7 days and meeting all other requirements     Today's Visits  Date Type Provider Dept   01/19/21 Telemedicine Sonido Sykes RN [facility]   Showing today's visits and meeting all other requirements     Future Appointments  No visits were found meeting these conditions  Showing future appointments within next 150 days and meeting all other requirements        After connecting through telephone, the patient was identified by name and date of birth  Johnnie Goodpasture was informed that this is a telemedicine visit and that the visit is being conducted through telephone  My office door was closed  No one else was in the room  He acknowledged consent and understanding of privacy and security of the platform  The patient has agreed to participate and understands he can discontinue the visit at any time  Patient is aware this is a billable service  Subjective    Johnnie Goodpasture is a 38 y o  male s/p: Left L4-5 minimally invasive microdiskectomy and epidural steroid injection  Spoke with patient to see how he is doing after surgery  Patient reports he is doing well overall and denies any incisional issues or fevers  Patient reports having some soreness today but he is able to ambulate around the house and complete ADLs  He is feeling better today than he was yesterday initially after the surgery    Educated the patient about the importance of preventing blood clots and provided measures how to prevent them  Patient has not moved his bowels since the surgery  Encouraged patient to take an over the counter stool softener, if he is taking narcotic pain medication  Encouraged fiber intake and fluids  Reviewed incision care with the patient  Advised that after three days he may take a shower and gently wash the surgical site with soap and water  Use clean wash cloth, towels, and clothing  Do not submerge in water until cleared by the surgeon  Do not apply any creams, ointments, or lotions to the site  Patient is aware to call the office if any redness, swelling, drainage, dehiscence of incision, or fever >100 F occurs  Patient is aware to call the office if any concerns or questions may arise  Reminded patient of his upcoming appointments with the date/time/location  Patient was appreciative for the call  Past Medical History:   Diagnosis Date    Chronic pain disorder     CPAP (continuous positive airway pressure) dependence     "bought it from [facility]"?  Depression     Low back pain     Sleep apnea        Past Surgical History:   Procedure Laterality Date    NO PAST SURGERIES      DE LAMNOTMY INCL W/DCMPRSN NRV ROOT 1 INTRSPC LUMBR Left 1/18/2021    Procedure: Left L4-5 minimally invasive microdiscectomy with epidural steroid injection;  Surgeon: Bryce Lopez MD;  Location: [facility];  Service: Neurosurgery       Current Outpatient Medications   Medication Sig Dispense Refill    oxyCODONE-acetaminophen (PERCOCET) 5-325 mg per tablet Take 1 tablet by mouth every 6 (six) hours as needed for moderate pain for up to 3 daysMax Daily Amount: 4 tablets 12 tablet 0    ibuprofen (MOTRIN) 200 mg tablet Take 600 mg by mouth every 6 (six) hours as needed       No current facility-administered medications for this visit  No Known Allergies    Review of Systems    There were no vitals filed for this visit          VIRTUAL VISIT DISCLAIMER    Obie Sierra acknowledges that he has consented to an online visit or consultation  He understands that the online visit is based solely on information provided by him, and that, in the absence of a face-to-face physical evaluation by the physician, the diagnosis he receives is both limited and provisional in terms of accuracy and completeness  This is not intended to replace a full medical face-to-face evaluation by the physician  Brenda Yap understands and accepts these terms

## 2021-02-01 ENCOUNTER — TELEMEDICINE (OUTPATIENT)
Dept: NEUROSURGERY | Facility: CLINIC | Age: 41
End: 2021-02-01

## 2021-02-01 DIAGNOSIS — Z98.890 STATUS POST SURGERY: Primary | ICD-10-CM

## 2021-02-01 PROCEDURE — 99024 POSTOP FOLLOW-UP VISIT: CPT

## 2021-02-01 NOTE — PROGRESS NOTES
Virtual Brief Visit    Assessment/Plan:    Problem List Items Addressed This Visit     None      Visit Diagnoses     Status post surgery    -  Primary                Reason for visit is   Chief Complaint   Patient presents with    Post-op    Virtual Brief Visit        Encounter provider Scarlett Baptiste RN    Provider located at [address] [place] [address] Dr [address] [place] [id number]    Recent Visits  No visits were found meeting these conditions  Showing recent visits within past 7 days and meeting all other requirements     Today's Visits  Date Type Provider Dept   02/01/21 Telemedicine Scarlett Baptiste RN [facility]   Showing today's visits and meeting all other requirements     Future Appointments  No visits were found meeting these conditions  Showing future appointments within next 150 days and meeting all other requirements        After connecting through telephone, the patient was identified by name and date of birth  Kasey Jarquin was informed that this is a telemedicine visit and that the visit is being conducted through telephone  My office door was closed  No one else was in the room  He acknowledged consent and understanding of privacy and security of the platform  The patient has agreed to participate and understands he can discontinue the visit at any time  Patient is aware this is a billable service  Subjective    Kasye Jarquin is a 38 y o  male s/p: Left L4-5 minimally invasive microdiscectomy with epidural steroid injection  Patient presents virtually for 2 week post-op visit for incision check  He reports that he is doing okay overall and denies any incisional issues or fevers  He does report having pain in his left buttock that radiates down to the left ankle  He denies any new weakness, numbness or tingling since the surgery    Patient is only taking alternating tylenol and ibuprofen for pain control and doesn't want to add anything else to the regimen at this time, I e  gabapentin  Past Medical History:   Diagnosis Date    Chronic pain disorder     CPAP (continuous positive airway pressure) dependence     "bought it from [employer]"?  Depression     Low back pain     Sleep apnea        Past Surgical History:   Procedure Laterality Date    NO PAST SURGERIES      VA LAMNOTMY INCL W/DCMPRSN NRV ROOT 1 INTRSPC LUMBR Left 1/18/2021    Procedure: Left L4-5 minimally invasive microdiscectomy with epidural steroid injection;  Surgeon: Mati Tobar MD;  Location: [facility];  Service: Neurosurgery       Current Outpatient Medications   Medication Sig Dispense Refill    ibuprofen (MOTRIN) 200 mg tablet Take 600 mg by mouth every 6 (six) hours as needed       No current facility-administered medications for this visit  No Known Allergies    Review of Systems    There were no vitals filed for this visit  Assessment:    Wound Exam: Incision well approximated  No erythema, edema or drainage present  Location: low back  Complications: None  Incision SANYA  Discussion/Summary:  Reviewed incision care with patient including daily observation for s/s infection including: increased erythema, edema, drainage, dehiscence of incision or fever >101  Should these be observed, he understands that he is to call and/or return immediately for reassessment  Advised patient to continue cleansing area with mild soap and water and pat dry  Not to apply any lotions, creams, or ointments, & not to submerge in any water for two more weeks  He is to maintain activity restrictions until cleared by the surgeon  Activity levels were also reviewed with the patient in detail, he is to slowly increase his level of activity and ambulation is encouraged as tolerated   Verified date/time/location of upcoming POV and reminded him to call the office with any further questions or concerns, or if any incisional issues or fevers would arise  VIRTUAL VISIT DISCLAIMER    Hemant Barton acknowledges that he has consented to an online visit or consultation  He understands that the online visit is based solely on information provided by him, and that, in the absence of a face-to-face physical evaluation by the physician, the diagnosis he receives is both limited and provisional in terms of accuracy and completeness  This is not intended to replace a full medical face-to-face evaluation by the physician  Hemant Barton understands and accepts these terms

## 2021-02-16 ENCOUNTER — TRANSCRIBE ORDERS (OUTPATIENT)
Dept: NEUROSURGERY | Facility: CLINIC | Age: 41
End: 2021-02-16

## 2021-02-16 ENCOUNTER — DOCUMENTATION (OUTPATIENT)
Dept: NEUROSURGERY | Facility: CLINIC | Age: 41
End: 2021-02-16

## 2021-02-16 DIAGNOSIS — Z98.890 STATUS POST SURGERY: Primary | ICD-10-CM

## 2021-02-16 NOTE — PROGRESS NOTES
Brionna Herzog called from [facility] rehab [phone]  asking for PT script , states pt said we orderd PT  ?  spokewith diana she added script - I called back got fax number and faxed over physical therapy script to [phone]

## 2021-03-03 ENCOUNTER — OFFICE VISIT (OUTPATIENT)
Dept: NEUROSURGERY | Facility: CLINIC | Age: 41
End: 2021-03-03

## 2021-03-03 ENCOUNTER — TELEPHONE (OUTPATIENT)
Dept: OBGYN CLINIC | Facility: MEDICAL CENTER | Age: 41
End: 2021-03-03

## 2021-03-03 VITALS
RESPIRATION RATE: 16 BRPM | SYSTOLIC BLOOD PRESSURE: 113 MMHG | DIASTOLIC BLOOD PRESSURE: 84 MMHG | WEIGHT: 225 LBS | BODY MASS INDEX: 29.82 KG/M2 | HEART RATE: 69 BPM | TEMPERATURE: 97.8 F | HEIGHT: 73 IN

## 2021-03-03 DIAGNOSIS — M54.16 LUMBAR RADICULITIS: Primary | ICD-10-CM

## 2021-03-03 PROCEDURE — 99024 POSTOP FOLLOW-UP VISIT: CPT | Performed by: NEUROLOGICAL SURGERY

## 2021-03-03 NOTE — ASSESSMENT & PLAN NOTE
Presents for 6-week POV s/p left L4-5 MIS microdiscectomy with RICHAR  · LLE radiculopathy resolved  Ambulation improved  · Continues to endorse left buttock pain, exacerbated with sitting  · Exam non-focal      Plan:  · Continue PT 3 x week as he has been  · Follow up with Dr Adriano Castaneda (pain) for pain management  · Discussed that symptoms of buttock pain may eventually resolve but that recommend evaluation by his previous pain management physician as well  · Follow up as needed for any new or worsening symptoms

## 2021-03-03 NOTE — TELEPHONE ENCOUNTER
Patient sees Dr Diane Raymundo  Patient is calling for a new work note for employer, stating no restrictions using todays date to return to work ( 03-)      Please fax to employer HR Dept at [phone]  He wants the note faxed today

## 2021-03-03 NOTE — TELEPHONE ENCOUNTER
I called and spoke with patient and relayed message  He told me that he did speak with neurosurgeon and they told him that they need to contact Ortho that on their end he is cleared  Patient did ask for an appt as he is still having back pain  I did schedule appt for 3/10 to discuss this and to see if you can clear him since Neuro keeps sending him back to Ortho

## 2021-03-03 NOTE — PROGRESS NOTES
Neurosurgery Office Note  Francy Rodriguez 38 y o  male MRN: [id number]      Assessment/Plan     Lumbar radiculitis  Presents for 6-week POV s/p left L4-5 MIS microdiscectomy with RICHAR  · LLE radiculopathy resolved  Ambulation improved  · Continues to endorse left buttock pain, exacerbated with sitting  · Exam non-focal      Plan:  · Continue PT 3 x week as he has been  · Follow up with Dr Anton Hester (pain) for pain management  · Discussed that symptoms of buttock pain may eventually resolve but that recommend evaluation by his previous pain management physician as well  · Follow up as needed for any new or worsening symptoms  Diagnoses and all orders for this visit:    Lumbar radiculitis            CHIEF COMPLAINT    Chief Complaint   Patient presents with    Post-op     Shared Jorge Salcido / Ubaldo Craven - 6 week post-op visit following a "Left L4-5 minimally invasive microdiscectomy with epidural steroid injection" on 1/18/21       HISTORY    History of Present Illness     38y o  year old male with past medical history of sleep apnea, depression, and chronic pain disorder  Patient presents for 6 week post-op visit following a left L4- L5 MIS microdiscectomy with RICHAR with Dr Ubaldo Craven  Prior to surgery, patient was having low back pain and was being treated with RICHAR of L 5-S1 and S1 transforaminal  Patient continued to have LLE radiculopathy  Upon assessment and evaluation, patient states he continues to have L buttock pain that is exacerbated with sitting, which makes it hard to get back up  He takes ibuprofen as needed for pain with relief  Patient states LLE radiculopathy has resolved  He denies any numbness and tingling of b/l LE  Denies difficulty with balance or walking  He denies any urinary or bowel incontinence  Patient states he goes therapy three times a week  He states in therapy with the stretching of his right leg he endorsed pain down his left leg       HPI    See Discussion    REVIEW OF SYSTEMS    Review of Systems   Constitutional: Positive for activity change (Unablt ot sit for long because it makes it hard to get back up)  HENT: Negative  Eyes: Negative  Respiratory: Negative  Cardiovascular: Negative  Gastrointestinal: Negative  Endocrine: Negative  Genitourinary: Negative  Musculoskeletal: Positive for back pain (LLBP)  Negative for gait problem  Skin: Negative  Allergic/Immunologic: Negative  Neurological: Negative  Hematological: Negative  Psychiatric/Behavioral: Negative  All other systems reviewed and are negative  Meds/Allergies     Current Outpatient Medications   Medication Sig Dispense Refill    ibuprofen (MOTRIN) 200 mg tablet Take 600 mg by mouth every 6 (six) hours as needed       No current facility-administered medications for this visit  No Known Allergies    PAST HISTORY    Past Medical History:   Diagnosis Date    Chronic pain disorder     CPAP (continuous positive airway pressure) dependence     "bought it from [facility]"?  Depression     Low back pain     Sleep apnea        Past Surgical History:   Procedure Laterality Date    NO PAST SURGERIES      CA LAMNOTMY INCL W/DCMPRSN NRV ROOT 1 INTRSPC LUMBR Left 1/18/2021    Procedure: Left L4-5 minimally invasive microdiscectomy with epidural steroid injection;  Surgeon: Sim Vinson MD;  Location: [facility];  Service: Neurosurgery       Social History     Tobacco Use    Smoking status: Never Smoker    Smokeless tobacco: Never Used   Substance Use Topics    Alcohol use: Yes     Binge frequency: Monthly     Comment: special occasion    Drug use: Not Currently       Family History   Problem Relation Age of Onset    No Known Problems Mother     No Known Problems Father          Above history personally reviewed  EXAM    Vitals:Blood pressure 113/84, pulse 69, temperature 97 8 °F (36 6 °C), temperature source Probe, resp   rate 16, height 6' 1" (1 854 m), weight 102 kg (225 lb)  ,Body mass index is 29 69 kg/m²  Physical Exam  Constitutional:       General: He is not in acute distress  Appearance: He is well-developed  He is not diaphoretic  HENT:      Head: Normocephalic and atraumatic  Eyes:      General:         Right eye: No discharge  Left eye: No discharge  Conjunctiva/sclera: Conjunctivae normal       Pupils: Pupils are equal, round, and reactive to light  Neck:      Musculoskeletal: Normal range of motion and neck supple  Pulmonary:      Effort: Pulmonary effort is normal  No respiratory distress  Abdominal:      General: There is no distension  Palpations: Abdomen is soft  Tenderness: There is no abdominal tenderness  Musculoskeletal: Normal range of motion  Skin:     General: Skin is warm and dry  Neurological:      Mental Status: He is alert and oriented to person, place, and time  Cranial Nerves: No cranial nerve deficit  Gait: Gait is intact  Deep Tendon Reflexes: Reflexes normal       Reflex Scores:       Patellar reflexes are 1+ on the right side and 1+ on the left side  Psychiatric:         Behavior: Behavior normal          Thought Content: Thought content normal          Judgment: Judgment normal          Neurologic Exam     Mental Status   Oriented to person, place, and time  Cranial Nerves     CN III, IV, VI   Pupils are equal, round, and reactive to light      Motor Exam   Muscle bulk: normal  Overall muscle tone: normal  Right leg tone: normal  Left leg tone: normal    Strength   Right quadriceps: 5/5  Left quadriceps: 5/5  Right hamstrin/5  Left hamstrin/5  Right glutei: 5/5  Left glutei: 5/5  Right anterior tibial: 5/5  Left anterior tibial: 5/5  Right posterior tibial: 5/5  Left posterior tibial: 5/5  Right peroneal: 5/5  Left peroneal: 5/5  Right gastroc: 5/5  Left gastroc: 5/5    Sensory Exam   Right arm light touch: normal  Left arm light touch: normal  Right arm proprioception: normal  Left arm proprioception: normal    Gait, Coordination, and Reflexes     Gait  Gait: normal    Reflexes   Right patellar: 1+  Left patellar: 1+  Right ankle clonus: absent  Left ankle clonus: absent        MEDICAL DECISION MAKING    Imaging Studies:     No results found  I have personally reviewed pertinent reports     and I have personally reviewed pertinent films in PACS

## 2022-07-13 ENCOUNTER — APPOINTMENT (OUTPATIENT)
Dept: RADIOLOGY | Facility: MEDICAL CENTER | Age: 42
End: 2022-07-13

## 2022-07-13 ENCOUNTER — OCCMED (OUTPATIENT)
Dept: URGENT CARE | Facility: MEDICAL CENTER | Age: 42
End: 2022-07-13
Payer: OTHER MISCELLANEOUS

## 2022-07-13 DIAGNOSIS — S16.1XXA STRAIN OF NECK MUSCLE, INITIAL ENCOUNTER: ICD-10-CM

## 2022-07-13 DIAGNOSIS — S29.012A STRAIN OF THORACIC SPINE: ICD-10-CM

## 2022-07-13 DIAGNOSIS — S39.012A STRAIN OF MUSCLE, FASCIA AND TENDON OF LOWER BACK, INITIAL ENCOUNTER: Primary | ICD-10-CM

## 2022-07-13 DIAGNOSIS — S39.012A STRAIN OF MUSCLE, FASCIA AND TENDON OF LOWER BACK, INITIAL ENCOUNTER: ICD-10-CM

## 2022-07-13 PROCEDURE — 72100 X-RAY EXAM L-S SPINE 2/3 VWS: CPT

## 2022-07-13 PROCEDURE — G0382 LEV 3 HOSP TYPE B ED VISIT: HCPCS | Performed by: PHYSICIAN ASSISTANT

## 2022-07-13 PROCEDURE — 72040 X-RAY EXAM NECK SPINE 2-3 VW: CPT

## 2022-07-13 PROCEDURE — 72072 X-RAY EXAM THORAC SPINE 3VWS: CPT

## 2022-07-13 PROCEDURE — 99283 EMERGENCY DEPT VISIT LOW MDM: CPT | Performed by: PHYSICIAN ASSISTANT

## 2022-07-19 ENCOUNTER — APPOINTMENT (OUTPATIENT)
Dept: URGENT CARE | Facility: MEDICAL CENTER | Age: 42
End: 2022-07-19
Payer: OTHER MISCELLANEOUS

## 2022-07-19 PROCEDURE — 99213 OFFICE O/P EST LOW 20 MIN: CPT | Performed by: EMERGENCY MEDICINE

## 2022-07-28 ENCOUNTER — APPOINTMENT (OUTPATIENT)
Dept: URGENT CARE | Facility: MEDICAL CENTER | Age: 42
End: 2022-07-28
Payer: OTHER MISCELLANEOUS

## 2022-07-28 PROCEDURE — 99213 OFFICE O/P EST LOW 20 MIN: CPT | Performed by: PHYSICIAN ASSISTANT

## (undated) DEVICE — SYRINGE 3ML LL

## (undated) DEVICE — INTENDED FOR TISSUE SEPARATION, AND OTHER PROCEDURES THAT REQUIRE A SHARP SURGICAL BLADE TO PUNCTURE OR CUT.: Brand: BARD-PARKER ® CARBON RIB-BACK BLADES

## (undated) DEVICE — DRESSING MEPILEX AG BORDER 4 X 4 IN

## (undated) DEVICE — Device

## (undated) DEVICE — PREP SURGICAL PURPREP 26ML

## (undated) DEVICE — BETHLEHEM UNIVERSAL SPINE, KIT: Brand: CARDINAL HEALTH

## (undated) DEVICE — PENCIL ELECTROSURG E-Z CLEAN -0035H

## (undated) DEVICE — SUT MONOCRYL PLUS 4-0 PS-2 18 IN MCP496G

## (undated) DEVICE — SYRINGE 10ML LL

## (undated) DEVICE — GLOVE INDICATOR PI UNDERGLOVE SZ 7.5 BLUE

## (undated) DEVICE — HEMOSTATIC MATRIX SURGIFLO 8ML W/THROMBIN

## (undated) DEVICE — NEEDLE 18 G X 1 1/2

## (undated) DEVICE — INTENDED FOR TISSUE SEPARATION, AND OTHER PROCEDURES THAT REQUIRE A SHARP SURGICAL BLADE TO PUNCTURE OR CUT.: Brand: BARD-PARKER SAFETY BLADES SIZE 15, STERILE

## (undated) DEVICE — SPONGE PVP SCRUB WING STERILE

## (undated) DEVICE — DRAPE MICROSCOPE OPMI PENTERO

## (undated) DEVICE — SUT VICRYL 0 UR-6 27 IN J603H

## (undated) DEVICE — MINOR PROCEDURE DRAPE: Brand: CONVERTORS

## (undated) DEVICE — TOOL 15BA50 LEGEND 15CM 5MM BA: Brand: MIDAS REX™

## (undated) DEVICE — GLOVE SRG BIOGEL 7.5

## (undated) DEVICE — ELECTRODE BLADE MOD  E-Z CLEAN 6.5IN -0014M

## (undated) DEVICE — SNAP KOVER: Brand: UNBRANDED

## (undated) DEVICE — NEEDLE SPINAL 22G X 3.5IN  QUINCKE

## (undated) DEVICE — ANTIBACTERIAL VIOLET BRAIDED (POLYGLACTIN 910), SYNTHETIC ABSORBABLE SUTURE: Brand: COATED VICRYL